# Patient Record
Sex: MALE | NOT HISPANIC OR LATINO | Employment: OTHER | URBAN - METROPOLITAN AREA
[De-identification: names, ages, dates, MRNs, and addresses within clinical notes are randomized per-mention and may not be internally consistent; named-entity substitution may affect disease eponyms.]

---

## 2017-05-27 ENCOUNTER — APPOINTMENT (OUTPATIENT)
Dept: CT IMAGING | Facility: HOSPITAL | Age: 77
End: 2017-05-27

## 2017-05-27 ENCOUNTER — HOSPITAL ENCOUNTER (EMERGENCY)
Facility: HOSPITAL | Age: 77
Discharge: HOME | End: 2017-05-27
Attending: EMERGENCY MEDICINE | Admitting: EMERGENCY MEDICINE

## 2017-05-27 VITALS
TEMPERATURE: 97.5 F | HEIGHT: 60 IN | SYSTOLIC BLOOD PRESSURE: 117 MMHG | OXYGEN SATURATION: 95 % | HEART RATE: 88 BPM | RESPIRATION RATE: 16 BRPM | DIASTOLIC BLOOD PRESSURE: 80 MMHG | WEIGHT: 180 LBS | BODY MASS INDEX: 35.34 KG/M2

## 2017-05-27 DIAGNOSIS — M54.2 NECK PAIN: Primary | ICD-10-CM

## 2017-05-27 LAB
ALBUMIN SERPL-MCNC: 3.7 G/DL (ref 3.5–5.2)
ALBUMIN/GLOB SERPL: 1.2 G/DL
ALP SERPL-CCNC: 57 U/L (ref 39–117)
ALT SERPL W P-5'-P-CCNC: 17 U/L (ref 1–41)
ANION GAP SERPL CALCULATED.3IONS-SCNC: 11.4 MMOL/L
AST SERPL-CCNC: 15 U/L (ref 1–40)
BASOPHILS # BLD AUTO: 0.03 10*3/MM3 (ref 0–0.2)
BASOPHILS NFR BLD AUTO: 0.4 % (ref 0–1.5)
BILIRUB SERPL-MCNC: 0.3 MG/DL (ref 0.1–1.2)
BUN BLD-MCNC: 12 MG/DL (ref 8–23)
BUN/CREAT SERPL: 9.4 (ref 7–25)
CALCIUM SPEC-SCNC: 9.3 MG/DL (ref 8.6–10.5)
CHLORIDE SERPL-SCNC: 99 MMOL/L (ref 98–107)
CO2 SERPL-SCNC: 26.6 MMOL/L (ref 22–29)
CREAT BLD-MCNC: 1.28 MG/DL (ref 0.76–1.27)
DEPRECATED RDW RBC AUTO: 44.8 FL (ref 37–54)
EOSINOPHIL # BLD AUTO: 0.16 10*3/MM3 (ref 0–0.7)
EOSINOPHIL NFR BLD AUTO: 2.2 % (ref 0.3–6.2)
ERYTHROCYTE [DISTWIDTH] IN BLOOD BY AUTOMATED COUNT: 14.5 % (ref 11.5–14.5)
GFR SERPL CREATININE-BSD FRML MDRD: 55 ML/MIN/1.73
GLOBULIN UR ELPH-MCNC: 3.1 GM/DL
GLUCOSE BLD-MCNC: 164 MG/DL (ref 65–99)
HCT VFR BLD AUTO: 41.4 % (ref 40.4–52.2)
HGB BLD-MCNC: 14.2 G/DL (ref 13.7–17.6)
IMM GRANULOCYTES # BLD: 0.02 10*3/MM3 (ref 0–0.03)
IMM GRANULOCYTES NFR BLD: 0.3 % (ref 0–0.5)
LYMPHOCYTES # BLD AUTO: 1.28 10*3/MM3 (ref 0.9–4.8)
LYMPHOCYTES NFR BLD AUTO: 18 % (ref 19.6–45.3)
MCH RBC QN AUTO: 28.9 PG (ref 27–32.7)
MCHC RBC AUTO-ENTMCNC: 34.3 G/DL (ref 32.6–36.4)
MCV RBC AUTO: 84.1 FL (ref 79.8–96.2)
MONOCYTES # BLD AUTO: 0.51 10*3/MM3 (ref 0.2–1.2)
MONOCYTES NFR BLD AUTO: 7.2 % (ref 5–12)
NEUTROPHILS # BLD AUTO: 5.12 10*3/MM3 (ref 1.9–8.1)
NEUTROPHILS NFR BLD AUTO: 71.9 % (ref 42.7–76)
PLATELET # BLD AUTO: 189 10*3/MM3 (ref 140–500)
PMV BLD AUTO: 9.5 FL (ref 6–12)
POTASSIUM BLD-SCNC: 4.3 MMOL/L (ref 3.5–5.2)
PROT SERPL-MCNC: 6.8 G/DL (ref 6–8.5)
RBC # BLD AUTO: 4.92 10*6/MM3 (ref 4.6–6)
S PYO AG THROAT QL: NEGATIVE
SODIUM BLD-SCNC: 137 MMOL/L (ref 136–145)
WBC NRBC COR # BLD: 7.12 10*3/MM3 (ref 4.5–10.7)

## 2017-05-27 PROCEDURE — 85025 COMPLETE CBC W/AUTO DIFF WBC: CPT | Performed by: NURSE PRACTITIONER

## 2017-05-27 PROCEDURE — 80053 COMPREHEN METABOLIC PANEL: CPT | Performed by: NURSE PRACTITIONER

## 2017-05-27 PROCEDURE — 99283 EMERGENCY DEPT VISIT LOW MDM: CPT

## 2017-05-27 PROCEDURE — 0 IOPAMIDOL 61 % SOLUTION: Performed by: NURSE PRACTITIONER

## 2017-05-27 PROCEDURE — 87081 CULTURE SCREEN ONLY: CPT | Performed by: NURSE PRACTITIONER

## 2017-05-27 PROCEDURE — 70491 CT SOFT TISSUE NECK W/DYE: CPT

## 2017-05-27 PROCEDURE — 87880 STREP A ASSAY W/OPTIC: CPT | Performed by: NURSE PRACTITIONER

## 2017-05-27 PROCEDURE — 96361 HYDRATE IV INFUSION ADD-ON: CPT

## 2017-05-27 PROCEDURE — 96360 HYDRATION IV INFUSION INIT: CPT

## 2017-05-27 RX ORDER — SIMVASTATIN 40 MG
40 TABLET ORAL NIGHTLY
COMMUNITY

## 2017-05-27 RX ORDER — NAPROXEN 500 MG/1
500 TABLET ORAL 2 TIMES DAILY PRN
Qty: 10 TABLET | Refills: 0 | Status: SHIPPED | OUTPATIENT
Start: 2017-05-27 | End: 2018-09-17 | Stop reason: ALTCHOICE

## 2017-05-27 RX ORDER — AMLODIPINE BESYLATE AND BENAZEPRIL HYDROCHLORIDE 10; 40 MG/1; MG/1
1 CAPSULE ORAL DAILY
COMMUNITY

## 2017-05-27 RX ORDER — ACETAMINOPHEN 500 MG
1000 TABLET ORAL ONCE
Status: COMPLETED | OUTPATIENT
Start: 2017-05-27 | End: 2017-05-27

## 2017-05-27 RX ORDER — GLIMEPIRIDE 4 MG/1
4 TABLET ORAL
COMMUNITY

## 2017-05-27 RX ADMIN — SODIUM CHLORIDE 1000 ML: 9 INJECTION, SOLUTION INTRAVENOUS at 10:35

## 2017-05-27 RX ADMIN — IOPAMIDOL 75 ML: 612 INJECTION, SOLUTION INTRAVENOUS at 11:14

## 2017-05-27 RX ADMIN — ACETAMINOPHEN 1000 MG: 500 TABLET ORAL at 11:22

## 2017-05-29 LAB — BACTERIA SPEC AEROBE CULT: NORMAL

## 2018-09-25 ENCOUNTER — OFFICE VISIT (OUTPATIENT)
Dept: NEUROSURGERY | Facility: CLINIC | Age: 78
End: 2018-09-25

## 2018-09-25 VITALS
HEIGHT: 71 IN | RESPIRATION RATE: 16 BRPM | DIASTOLIC BLOOD PRESSURE: 64 MMHG | WEIGHT: 188 LBS | HEART RATE: 76 BPM | BODY MASS INDEX: 26.32 KG/M2 | SYSTOLIC BLOOD PRESSURE: 110 MMHG

## 2018-09-25 DIAGNOSIS — M43.10 ACQUIRED SPONDYLOLISTHESIS: ICD-10-CM

## 2018-09-25 DIAGNOSIS — M48.062 LUMBAR STENOSIS WITH NEUROGENIC CLAUDICATION: ICD-10-CM

## 2018-09-25 DIAGNOSIS — M51.26 LUMBAR DISC HERNIATION: ICD-10-CM

## 2018-09-25 DIAGNOSIS — I10 HYPERTENSION, UNSPECIFIED TYPE: ICD-10-CM

## 2018-09-25 DIAGNOSIS — M62.559 ATROPHY OF QUADRICEPS FEMORIS MUSCLE: Primary | ICD-10-CM

## 2018-09-25 DIAGNOSIS — E11.8 TYPE 2 DIABETES MELLITUS WITH COMPLICATION, WITHOUT LONG-TERM CURRENT USE OF INSULIN (HCC): ICD-10-CM

## 2018-09-25 PROBLEM — E78.5 HYPERLIPIDEMIA: Status: ACTIVE | Noted: 2018-09-25

## 2018-09-25 PROBLEM — E11.9 DIABETES MELLITUS (HCC): Status: ACTIVE | Noted: 2018-09-25

## 2018-09-25 PROCEDURE — 99204 OFFICE O/P NEW MOD 45 MIN: CPT | Performed by: NEUROLOGICAL SURGERY

## 2018-09-25 NOTE — PROGRESS NOTES
Subjective   Patient ID: Claudio Haney is a 78 y.o. male is being seen for consultation today at the request of A Tiffanie Marina MD for right leg and back pain. He is unaccompanied.    History of Present Illness  Patient presents for evaluation and treatment recommendations of back and right leg pain. It began about 1 year ago. The low back pain is intermittent with activity. It is an aggravating, annoying pain, worst 8/10. It resolves with rest. He had associated right leg pain from the onset. It bothers him more than the back pain. It is an aching pain, most severe in the anterior thigh and occasionally in the shin. He cannot walk more than 20 feet without pain; no left leg pain. He has resolution with sitting. He has right leg weakness with walking and climbing stairs. No falls. He has constipation. He has PVD, no other incontinence. He has had PT, chiropractic care, and 3 LESI with brief relief. No meds for pain. No prior back surgery.     The following portions of the patient's history were reviewed and updated as appropriate: allergies, current medications, past family history, past medical history, past social history, past surgical history and problem list.    Review of Systems   Constitutional: Negative for fever.   HENT: Negative for trouble swallowing.    Eyes: Negative for visual disturbance.   Respiratory: Negative for cough and wheezing.    Cardiovascular: Negative for chest pain and palpitations.   Gastrointestinal: Negative for abdominal pain.   Genitourinary: Positive for enuresis. Negative for difficulty urinating.   Musculoskeletal: Positive for back pain (into right leg). Negative for gait problem.   Skin: Negative for rash.   Neurological: Positive for weakness (RLE). Negative for numbness.   Psychiatric/Behavioral: Negative for sleep disturbance.       Objective   Physical Exam   Constitutional: He is oriented to person, place, and time. He appears well-developed and well-nourished.   Body mass  index is 26.22 kg/m².     Pulmonary/Chest: Effort normal.   Musculoskeletal:        Lumbar back: He exhibits decreased range of motion and pain (with extension). He exhibits no tenderness.   Neurological: He is alert and oriented to person, place, and time.   Reflex Scores:       Patellar reflexes are 1+ on the right side and 1+ on the left side.       Achilles reflexes are 0 on the right side and 0 on the left side.  Skin: Skin is warm and dry.   Psychiatric: He has a normal mood and affect. His behavior is normal. Judgment normal.   Vitals reviewed.    Neurologic Exam     Mental Status   Oriented to person, place, and time.   Level of consciousness: alert  Knowledge: good.   Normal comprehension.     Motor Exam   Muscle bulk: decreased (right quad and left calf)    Strength   Strength 5/5 except as noted.   Right iliopsoas: 4/5Right quad 4+/5       Sensory Exam   Right leg light touch: normal  Left leg light touch: normal  Right leg vibration: decreased from ankle  Left leg vibration: decreased from ankle  Non dermatonal temperature changes taco LEs     Gait, Coordination, and Reflexes     Gait  Gait: (antalgic)    Reflexes   Right patellar: 1+  Left patellar: 1+  Right achilles: 0  Left achilles: 0  Able to heel walk bilaterally; some difficulty toe walking on right; unable to arise from a squatted position on right leg singularly       Assessment/Plan   Independent Review of Radiographic Studies:    I personally reviewed an MRI scan of the lumbar spine done in April 2018: This demonstrates severe lumbar spinal stenosis at L3 4 with a grade 1 spondylolisthesis, a large disc herniation to the right at L4 5, and a right-sided L5-S1 disc herniation.  At L4 5 there is obliteration of the right neural foramen filled with disc material.  Medical Decision Making:    I confirmed and obtained the above history as recorded by the nurse practitioner acting as a scribe. I performed the above examination and it is documented  by the nurse practitioner acting as a scribe.    As described above this unfortunate patient has been suffering with back problems at least for 2 years.  He has gradually developed more and more atrophy of the right quadriceps muscle.  He has constant discomfort that is severely worsened by walking.  He is unable to walk more than a few feet without severe pain.  As noted he has atrophy and weakness in the lower extremity.  As noted his MRI from 6 months ago shows very severe lumbar disease with severe stenosis and a grade 1 spondylolisthesis.    I'm recommending that he undergo to contemporary imaging to make sure there were not dealing with something else in the lumbar spine.  Ordered a new MRI.  He also has a grade 1 slip of the L3 bone on the L4 bone anteriorly so I am ordering flexion-extension x-rays of the lumbar spine as well in order to see whether there is any abnormal motion of these left.    I suspect that this unfortunate gentleman is going to require some kind of surgical procedure and the new imaging will help me determine the least invasive procedure that we can accomplish in order to decompress the spinal nerve roots.  I do believe that decompression of the spinal nerve roots will give him the best chance of recovery.    Given that this is been ongoing and that there is severe atrophy of the right quadriceps I'm hopeful that he will have some level of recovery following decompression of the nerve roots.  I explained to the patient that this may not be forthcoming because this problem has been ongoing for a while.      Claudio was seen today for leg pain and back pain.    Diagnoses and all orders for this visit:    Atrophy of right quadriceps femoris muscle    Lumbar stenosis with neurogenic claudication  -     MRI Lumbar Spine Without Contrast; Future  -     XR Spine Cervical Complete With Flex Ext; Future    Left  L4/5 and L5/Y7quqmk disc herniation    Type 2 diabetes mellitus with complication,  without long-term current use of insulin (CMS/[facility])    Hypertension, unspecified type    Acquired spondylolisthesis L3/4 Grade I      Return for Follow up after testing.

## 2018-10-30 ENCOUNTER — OFFICE VISIT (OUTPATIENT)
Dept: NEUROSURGERY | Facility: CLINIC | Age: 78
End: 2018-10-30

## 2018-10-30 ENCOUNTER — HOSPITAL ENCOUNTER (OUTPATIENT)
Dept: GENERAL RADIOLOGY | Facility: HOSPITAL | Age: 78
Discharge: HOME | End: 2018-10-30
Attending: NEUROLOGICAL SURGERY

## 2018-10-30 ENCOUNTER — HOSPITAL ENCOUNTER (OUTPATIENT)
Dept: GENERAL RADIOLOGY | Facility: HOSPITAL | Age: 78
Discharge: HOME | End: 2018-10-30
Attending: NEUROLOGICAL SURGERY | Admitting: NEUROLOGICAL SURGERY

## 2018-10-30 VITALS
WEIGHT: 185 LBS | DIASTOLIC BLOOD PRESSURE: 60 MMHG | HEART RATE: 76 BPM | SYSTOLIC BLOOD PRESSURE: 100 MMHG | BODY MASS INDEX: 25.8 KG/M2 | RESPIRATION RATE: 16 BRPM

## 2018-10-30 DIAGNOSIS — M43.10 ACQUIRED SPONDYLOLISTHESIS: ICD-10-CM

## 2018-10-30 DIAGNOSIS — E78.5 HYPERLIPIDEMIA, UNSPECIFIED HYPERLIPIDEMIA TYPE: ICD-10-CM

## 2018-10-30 DIAGNOSIS — M48.062 LUMBAR STENOSIS WITH NEUROGENIC CLAUDICATION: Primary | ICD-10-CM

## 2018-10-30 DIAGNOSIS — M48.062 LUMBAR STENOSIS WITH NEUROGENIC CLAUDICATION: ICD-10-CM

## 2018-10-30 DIAGNOSIS — M62.559 ATROPHY OF QUADRICEPS FEMORIS MUSCLE: ICD-10-CM

## 2018-10-30 DIAGNOSIS — M51.26 LUMBAR DISC HERNIATION: ICD-10-CM

## 2018-10-30 DIAGNOSIS — I10 HYPERTENSION, UNSPECIFIED TYPE: ICD-10-CM

## 2018-10-30 DIAGNOSIS — E11.65 TYPE 2 DIABETES MELLITUS WITH HYPERGLYCEMIA, WITHOUT LONG-TERM CURRENT USE OF INSULIN (HCC): ICD-10-CM

## 2018-10-30 PROCEDURE — 99214 OFFICE O/P EST MOD 30 MIN: CPT | Performed by: NEUROLOGICAL SURGERY

## 2018-10-30 PROCEDURE — 72114 X-RAY EXAM L-S SPINE BENDING: CPT

## 2018-10-30 RX ORDER — SODIUM CHLORIDE, SODIUM LACTATE, POTASSIUM CHLORIDE, CALCIUM CHLORIDE 600; 310; 30; 20 MG/100ML; MG/100ML; MG/100ML; MG/100ML
100 INJECTION, SOLUTION INTRAVENOUS CONTINUOUS
Status: CANCELLED | OUTPATIENT
Start: 2018-12-17

## 2018-10-30 RX ORDER — CEFAZOLIN SODIUM 2 G/100ML
2 INJECTION, SOLUTION INTRAVENOUS ONCE
Status: CANCELLED | OUTPATIENT
Start: 2018-12-17 | End: 2018-10-30

## 2018-10-30 NOTE — PROGRESS NOTES
Subjective   Patient ID: Claudio Haney is a 78 y.o. male is here today for follow-up of right leg weakness with new MRI and xrays. He is unaccompanied.    History of Present Illness    He returns to the office today for ongoing follow-up with history of right leg weakness.  He has had new l MRI and umbar x-rays with flexion and extension.    The right leg pain started approximately 1 year ago and is worse then back pain.  It is an aching discomfort more severe in the anterior thigh and occasionally into the shin.  He cannot walk more than 20 feet without pain.  He has tried conservative treatment with PT, chiropractic care and epidurals, none of which have really helped.    He continues with the same right leg symptoms and denies any changes.  He continues to feel very weak in the right leg.  He is ready to proceed forward with surgery.  He has significant atrophy of the right quad muscle which is making walking difficult.    He has no bowel or bladder incontinence.  He presents unaccompanied.        /60 (BP Location: Right arm, Patient Position: Sitting, Cuff Size: Adult)   Pulse 76   Resp 16   Wt 83.9 kg (185 lb)   BMI 25.80 kg/m²        The following portions of the patient's history were reviewed and updated as appropriate: allergies, current medications, past family history, past medical history, past social history, past surgical history and problem list.    Review of Systems   Musculoskeletal: Positive for gait problem. Negative for back pain.   Neurological: Positive for weakness (right leg) and numbness (very minor, intermittent tingling right leg).       Objective   Physical Exam   Constitutional: He is oriented to person, place, and time. Vital signs are normal. He appears well-developed and well-nourished. He is cooperative.  Non-toxic appearance. He does not have a sickly appearance. He does not appear ill.   HENT:   Head: Normocephalic and atraumatic.   Eyes: EOM are normal.   Neck: Neck  supple.   Pulmonary/Chest: Effort normal.   Musculoskeletal: Normal range of motion.   Right lower extremity weakness with right quad atrophy   Neurological: He is alert and oriented to person, place, and time. GCS eye subscore is 4. GCS verbal subscore is 5. GCS motor subscore is 6.   If stable, upright and antalgic, forward flexed the waist   Skin: Skin is warm and dry.   Vitals reviewed.    Neurologic Exam     Mental Status   Oriented to person, place, and time.     Cranial Nerves     CN III, IV, VI   Extraocular motions are normal.       Assessment/Plan   Independent Review of Radiographic Studies:    I personally reviewed the MRI scan of the lumbar spine done recently: This demonstrates severe lumbar spinal stenosis at L3 4, and disc herniation to the right at L4 5 and at L5-S1 with compromise of the lateral recess and neural foramen.  Flexion-extension x-rays demonstrate mobile spondylolisthesis at L3 4 of 5 mm or more in flexion and with extension this is completely reduced.    Medical Decision Making:    I confirmed and obtained the above history as recorded by the nurse practitioner acting as a scribe. I performed the above examination and it is documented by the nurse practitioner acting as a scribe.    Patient returns the office with continuing complaints as described above.  He does have significant atrophy of the leg as well as severe pain with ambulation.  This is completely inexplicable based on the MRI and the flexion extension x-rays of the lumbar spine.    I do not think that there is nonsurgical management that is going to make any difference at all.    I think that surgery gives him the best chance and hope of recovery.    The patient has substantial atrophy of the leg and therefore I'm concerned that it may not return to a fully normal state that I'm hopeful for improvement.    I think that the least invasive way to deal with this would be a minimally invasive lumbar decompression at L4 5 and  L5-S1 with lumbar discectomy.  I would then follow with the L3 4 lumbar decompression and interbody fusion with posterior and interbody instrumentation.    This minimally invasive approach addresses all 3 levels of significant spinal pathology to decompress the nerve roots coming into the right leg.    The risks, benefits, and alternatives of decompression and fusion were explained in detail to the patient. The alternative is not to perform an operative procedure. The benefit should be, though is not guaranteed, primarily a potential reduction in the neurosensory and/or motor dysfunction; secondarily, a possible reduction in back pain. The risks include, but are not limited to, the possibility of death, infection, stroke, bleeding, blindness, paralysis, blindness, lack of improvement in the patient's pain syndrome, worsening of the pain syndrome, meningitis, osteomyelitis, recurrent disc herniation, need for further operative intervention, recurrence of the pain syndrome, sexual dysfunction, incontinence, inability to urinate without catheterization, inability to have a normal bowel movement, epidural scarring resulting in chronic back pain, leakage of cerebral spinal fluid at the time of surgery or after recovery from surgery requiring further operative intervention, lack of bony fusion requiring further surgery, instrumentation breakdown or pull out, adjacent level spinal degeneration, spinal instability. I also explained the realistic expectations as they pertain to the procedure. The patient voiced understanding of these risks, benefits, alternatives, and realistic expectations and requests that we proceed with the operative intervention.    After a complete physical exam, the patient has been informed of the consequences, benefits, appropriate us, and office policies regarding the medication being prescribed. A MAICOL check will be made on-line, and will be repeated if prescription is renewed after a 90 day  period. The patient agrees to adhering to the medication regimen as prescribed.    The patient has been advised that we will manage post-operative pain for 1 month. If further narcotic medication is needed beyond that period, a referral back to the primary care physician or to a pain management specialist will be made. If the patient cancels or fails to show for scheduled follow-up visits or the pain management referral,  further narcotic prescriptions from this practice may cease.    Plan:Right L4 5 and L5-S1 lumbar discectomy, Right L3 4 lumbar decompression with interbody and posterior lateral fusion and interbody and posterior instrumentation.  He will need medical clearance prior to surgery.    Claudio was seen today for extremity weakness.    Diagnoses and all orders for this visit:    Lumbar stenosis with neurogenic claudication  -     Case Request; Standing  -     Basic metabolic panel; Future  -     Sedimentation rate; Future  -     ECG 12 Lead; Future  -     XR Chest 2 View; Future  -     lactated ringers infusion; Infuse 100 mL/hr into a venous catheter Continuous.  -     ceFAZolin (ANCEF) 2 g in sodium chloride 0.9 % 100 mL IVPB; Infuse 2 g into a venous catheter 1 (One) Time.  -     Case Request    Atrophy of right quadriceps femoris muscle    Acquired spondylolisthesis L3/4 Grade I    Type 2 diabetes mellitus with hyperglycemia, without long-term current use of insulin (CMS/[facility])    Hyperlipidemia, unspecified hyperlipidemia type    Hypertension, unspecified type    Left  L4/5 and L5/E4kwwis disc herniation    Other orders  -     Follow anesthesia standing orders.  -     Obtain informed consent  -     Clorhexidine skin prep; Future  -     Follow anesthesia standing orders.; Standing  -     Verify NPO Status; Standing  -     SCD (sequential compression device)- to be placed on patient in Pre-op; Standing  -     POC Glucose Once; Standing  -     Inpatient Admission; Standing  -     Type & Screen;  Standing      Return for Follow up with nurse practitioner, Recheck 2 weeks after surgery.

## 2018-11-04 ENCOUNTER — RESULTS ENCOUNTER (OUTPATIENT)
Dept: NEUROSURGERY | Facility: CLINIC | Age: 78
End: 2018-11-04

## 2018-11-04 DIAGNOSIS — M48.062 LUMBAR STENOSIS WITH NEUROGENIC CLAUDICATION: ICD-10-CM

## 2018-11-13 ENCOUNTER — TELEPHONE (OUTPATIENT)
Dept: NEUROSURGERY | Facility: CLINIC | Age: 78
End: 2018-11-13

## 2018-11-13 DIAGNOSIS — M48.061 SPINAL STENOSIS, LUMBAR REGION, WITHOUT NEUROGENIC CLAUDICATION: Primary | ICD-10-CM

## 2018-11-13 DIAGNOSIS — Z98.890 POST-OPERATIVE STATE: ICD-10-CM

## 2018-11-13 NOTE — TELEPHONE ENCOUNTER
Please enter Post op lumbar xray AP/LAT order for diagnosis of lumbar stenosis. Post op appt is on December 31 at 1:00 with Dodie. Thanks!

## 2018-11-30 ENCOUNTER — OFFICE VISIT (OUTPATIENT)
Dept: NEUROSURGERY | Facility: CLINIC | Age: 78
End: 2018-11-30

## 2018-11-30 ENCOUNTER — HOSPITAL ENCOUNTER (OUTPATIENT)
Dept: GENERAL RADIOLOGY | Facility: HOSPITAL | Age: 78
Discharge: HOME | End: 2018-11-30
Admitting: NEUROLOGICAL SURGERY

## 2018-11-30 ENCOUNTER — APPOINTMENT (OUTPATIENT)
Dept: PREADMISSION TESTING | Facility: HOSPITAL | Age: 78
End: 2018-11-30

## 2018-11-30 VITALS
WEIGHT: 189 LBS | SYSTOLIC BLOOD PRESSURE: 110 MMHG | DIASTOLIC BLOOD PRESSURE: 70 MMHG | HEART RATE: 68 BPM | RESPIRATION RATE: 16 BRPM | HEIGHT: 71 IN | BODY MASS INDEX: 26.46 KG/M2

## 2018-11-30 VITALS
DIASTOLIC BLOOD PRESSURE: 77 MMHG | BODY MASS INDEX: 26.6 KG/M2 | SYSTOLIC BLOOD PRESSURE: 116 MMHG | RESPIRATION RATE: 20 BRPM | WEIGHT: 190 LBS | HEART RATE: 64 BPM | TEMPERATURE: 97.7 F | OXYGEN SATURATION: 99 % | HEIGHT: 71 IN

## 2018-11-30 DIAGNOSIS — M62.559 ATROPHY OF QUADRICEPS FEMORIS MUSCLE: ICD-10-CM

## 2018-11-30 DIAGNOSIS — M48.062 LUMBAR STENOSIS WITH NEUROGENIC CLAUDICATION: ICD-10-CM

## 2018-11-30 DIAGNOSIS — M43.10 ACQUIRED SPONDYLOLISTHESIS: ICD-10-CM

## 2018-11-30 DIAGNOSIS — M48.062 LUMBAR STENOSIS WITH NEUROGENIC CLAUDICATION: Primary | ICD-10-CM

## 2018-11-30 DIAGNOSIS — M51.26 LUMBAR DISC HERNIATION: ICD-10-CM

## 2018-11-30 LAB
ANION GAP SERPL CALCULATED.3IONS-SCNC: 11 MMOL/L
BUN BLD-MCNC: 19 MG/DL (ref 8–23)
BUN/CREAT SERPL: 15.8 (ref 7–25)
CALCIUM SPEC-SCNC: 9 MG/DL (ref 8.6–10.5)
CHLORIDE SERPL-SCNC: 99 MMOL/L (ref 98–107)
CO2 SERPL-SCNC: 26 MMOL/L (ref 22–29)
CREAT BLD-MCNC: 1.2 MG/DL (ref 0.76–1.27)
DEPRECATED RDW RBC AUTO: 44.5 FL (ref 37–54)
ERYTHROCYTE [DISTWIDTH] IN BLOOD BY AUTOMATED COUNT: 13.6 % (ref 11.5–14.5)
ERYTHROCYTE [SEDIMENTATION RATE] IN BLOOD: 7 MM/HR (ref 0–20)
GFR SERPL CREATININE-BSD FRML MDRD: 59 ML/MIN/1.73
GLUCOSE BLD-MCNC: 180 MG/DL (ref 65–99)
HCT VFR BLD AUTO: 44.2 % (ref 40.4–52.2)
HGB BLD-MCNC: 13.8 G/DL (ref 13.7–17.6)
MCH RBC QN AUTO: 28.5 PG (ref 27–32.7)
MCHC RBC AUTO-ENTMCNC: 31.2 G/DL (ref 32.6–36.4)
MCV RBC AUTO: 91.1 FL (ref 79.8–96.2)
PLATELET # BLD AUTO: 218 10*3/MM3 (ref 140–500)
PMV BLD AUTO: 9.7 FL (ref 6–12)
POTASSIUM BLD-SCNC: 4.5 MMOL/L (ref 3.5–5.2)
RBC # BLD AUTO: 4.85 10*6/MM3 (ref 4.6–6)
SODIUM BLD-SCNC: 136 MMOL/L (ref 136–145)
WBC NRBC COR # BLD: 7.66 10*3/MM3 (ref 4.5–10.7)

## 2018-11-30 PROCEDURE — 93010 ELECTROCARDIOGRAM REPORT: CPT | Performed by: INTERNAL MEDICINE

## 2018-11-30 PROCEDURE — 36415 COLL VENOUS BLD VENIPUNCTURE: CPT | Performed by: NEUROLOGICAL SURGERY

## 2018-11-30 PROCEDURE — 71046 X-RAY EXAM CHEST 2 VIEWS: CPT

## 2018-11-30 PROCEDURE — 85027 COMPLETE CBC AUTOMATED: CPT | Performed by: NEUROLOGICAL SURGERY

## 2018-11-30 PROCEDURE — 93005 ELECTROCARDIOGRAM TRACING: CPT

## 2018-11-30 PROCEDURE — S0260 H&P FOR SURGERY: HCPCS | Performed by: NEUROLOGICAL SURGERY

## 2018-11-30 PROCEDURE — 80048 BASIC METABOLIC PNL TOTAL CA: CPT | Performed by: NEUROLOGICAL SURGERY

## 2018-11-30 PROCEDURE — 85652 RBC SED RATE AUTOMATED: CPT | Performed by: NEUROLOGICAL SURGERY

## 2018-11-30 NOTE — PROGRESS NOTES
"Subjective   Patient ID: Claudio Haney is a 78 y.o. male is here today for follow-up of lumbar stenosis with atrophy prior to scheduled surgery on 12/13/18 .    History of Present Illness     He returns to the office today for ongoing follow-up of right leg weakness, pain and atrophy.  He will also undergo preoperative evaluation and updated history and physical before scheduled surgery on December 13.  He is scheduled to undergo right-sided L4 5 and L5-S1 lumbar discectomy as well as right L3 4 lumbar decompression and fusion.    He denies any new problems and states that he is feeling well.  He is hopeful surgery will help with this ongoing right leg issues.  He states that he has decreased his activity level which has helped to minimally decreased some right leg pain.  He denies any recent fever or chills.  He is ready to proceed forward with surgery as scheduled.    He presents unaccompanied      Blood pressure 110/70, pulse 68, resp. rate 16, height 180.3 cm (71\"), weight 85.7 kg (189 lb).        The following portions of the patient's history were reviewed and updated as appropriate: allergies, current medications, past family history, past medical history, past social history, past surgical history and problem list.    Review of Systems   Constitutional: Negative for fever.   HENT: Negative for trouble swallowing.    Respiratory: Negative for cough and wheezing.    Cardiovascular: Negative for chest pain and leg swelling.   Musculoskeletal: Positive for back pain (better with rest) and gait problem.        Muscle atrophy noted in right leg   Neurological: Positive for weakness. Negative for numbness.       Objective   Physical Exam   Constitutional: He is oriented to person, place, and time. Vital signs are normal. He appears well-developed and well-nourished. He is cooperative.  Non-toxic appearance. He does not have a sickly appearance. He does not appear ill.   Very pleasant, well-appearing older gentleman "   HENT:   Head: Normocephalic and atraumatic.   Eyes: EOM are normal. Pupils are equal, round, and reactive to light.   Neck: Normal range of motion. Neck supple. Carotid bruit is not present. No tracheal deviation present.   Cardiovascular: Normal rate, regular rhythm, normal heart sounds and intact distal pulses.   Pulmonary/Chest: Effort normal and breath sounds normal. No respiratory distress.   Abdominal: Soft.   Musculoskeletal: Normal range of motion.   Neurological: He is alert and oriented to person, place, and time. No cranial nerve deficit. Coordination and gait normal. GCS eye subscore is 4. GCS verbal subscore is 5. GCS motor subscore is 6.   Gait is stable and upright, antalgic on the right   Skin: Skin is warm and dry.   Psychiatric: He has a normal mood and affect. His behavior is normal. Thought content normal.   Vitals reviewed.    Neurologic Exam     Mental Status   Oriented to person, place, and time.     Cranial Nerves     CN III, IV, VI   Pupils are equal, round, and reactive to light.  Extraocular motions are normal.       Assessment/Plan   Independent Review of Radiographic Studies:    Preoperative lab work, EKG and chest x-ray reviewed revealing stable findings      Medical Decision Making:    He returns the office today for preoperative evaluation before undergoing scheduled lumbar decompression and fusion with Dr. Flores on December 13.  Exam as noted above, no red flags.  He denies any new problems or issues since his last office visit but does continue with right quadriceps weakness, atrophy in right leg pain.  He is ready to proceed forward with surgery as scheduled and upon full clinical evaluation is stable from our standpoint to do so.  Risks and benefits were explained at length by Dr. Flores at his last office visit.  All questions were answered at length.  He is to follow-up at his previously scheduled postoperative visit.    He has been placed in an LSO brace with lateral support  for preoperative stabilization and postoperative healing.    Plan: Return to office following surgery as scheduled        Claudio was seen today for pre-op exam.    Diagnoses and all orders for this visit:    Lumbar stenosis with neurogenic claudication    Left  L4/5 and L5/Y1ihmqi disc herniation    Atrophy of right quadriceps femoris muscle    Acquired spondylolisthesis L3/4 Grade I      No Follow-up on file.

## 2018-12-17 ENCOUNTER — ANESTHESIA (OUTPATIENT)
Dept: PERIOP | Facility: HOSPITAL | Age: 78
End: 2018-12-17

## 2018-12-17 ENCOUNTER — APPOINTMENT (OUTPATIENT)
Dept: GENERAL RADIOLOGY | Facility: HOSPITAL | Age: 78
End: 2018-12-17

## 2018-12-17 ENCOUNTER — HOSPITAL ENCOUNTER (INPATIENT)
Facility: HOSPITAL | Age: 78
LOS: 3 days | Discharge: HOME HEALTH | End: 2018-12-20
Attending: NEUROLOGICAL SURGERY | Admitting: NEUROLOGICAL SURGERY

## 2018-12-17 ENCOUNTER — ANESTHESIA EVENT (OUTPATIENT)
Dept: PERIOP | Facility: HOSPITAL | Age: 78
End: 2018-12-17

## 2018-12-17 DIAGNOSIS — M51.26 LUMBAR DISC HERNIATION: Primary | ICD-10-CM

## 2018-12-17 DIAGNOSIS — M62.559 ATROPHY OF QUADRICEPS FEMORIS MUSCLE: ICD-10-CM

## 2018-12-17 DIAGNOSIS — M48.062 LUMBAR STENOSIS WITH NEUROGENIC CLAUDICATION: ICD-10-CM

## 2018-12-17 DIAGNOSIS — M43.10 ACQUIRED SPONDYLOLISTHESIS: ICD-10-CM

## 2018-12-17 LAB
ABO GROUP BLD: NORMAL
BLD GP AB SCN SERPL QL: NEGATIVE
DEPRECATED RDW RBC AUTO: 47.3 FL (ref 37–54)
ERYTHROCYTE [DISTWIDTH] IN BLOOD BY AUTOMATED COUNT: 13.9 % (ref 11.5–14.5)
GLUCOSE BLDC GLUCOMTR-MCNC: 153 MG/DL (ref 70–130)
GLUCOSE BLDC GLUCOMTR-MCNC: 262 MG/DL (ref 70–130)
HCT VFR BLD AUTO: 40.1 % (ref 40.4–52.2)
HGB BLD-MCNC: 12.2 G/DL (ref 13.7–17.6)
MCH RBC QN AUTO: 28 PG (ref 27–32.7)
MCHC RBC AUTO-ENTMCNC: 30.4 G/DL (ref 32.6–36.4)
MCV RBC AUTO: 92.2 FL (ref 79.8–96.2)
PLATELET # BLD AUTO: 264 10*3/MM3 (ref 140–500)
PMV BLD AUTO: 10.1 FL (ref 6–12)
RBC # BLD AUTO: 4.35 10*6/MM3 (ref 4.6–6)
RH BLD: POSITIVE
T&S EXPIRATION DATE: NORMAL
WBC NRBC COR # BLD: 15.66 10*3/MM3 (ref 4.5–10.7)

## 2018-12-17 PROCEDURE — 93010 ELECTROCARDIOGRAM REPORT: CPT | Performed by: INTERNAL MEDICINE

## 2018-12-17 PROCEDURE — 76000 FLUOROSCOPY <1 HR PHYS/QHP: CPT

## 2018-12-17 PROCEDURE — 25010000002 PHENYLEPHRINE 10 MG/ML SOLUTION 5 ML VIAL: Performed by: ANESTHESIOLOGY

## 2018-12-17 PROCEDURE — 25010000002 PHENYLEPHRINE PER 1 ML: Performed by: NURSE ANESTHETIST, CERTIFIED REGISTERED

## 2018-12-17 PROCEDURE — 01NB0ZZ RELEASE LUMBAR NERVE, OPEN APPROACH: ICD-10-PCS | Performed by: NEUROLOGICAL SURGERY

## 2018-12-17 PROCEDURE — 63047 LAM FACETEC & FORAMOT LUMBAR: CPT | Performed by: NEUROLOGICAL SURGERY

## 2018-12-17 PROCEDURE — 25010000002 VANCOMYCIN 1 G RECONSTITUTED SOLUTION 1 EACH VIAL: Performed by: NEUROLOGICAL SURGERY

## 2018-12-17 PROCEDURE — 25010000002 DEXAMETHASONE PER 1 MG: Performed by: NURSE ANESTHETIST, CERTIFIED REGISTERED

## 2018-12-17 PROCEDURE — 25010000002 ALBUMIN HUMAN 5% PER 50 ML

## 2018-12-17 PROCEDURE — 86850 RBC ANTIBODY SCREEN: CPT | Performed by: NEUROLOGICAL SURGERY

## 2018-12-17 PROCEDURE — 85027 COMPLETE CBC AUTOMATED: CPT | Performed by: ANESTHESIOLOGY

## 2018-12-17 PROCEDURE — 25010000002 MIDAZOLAM PER 1 MG: Performed by: ANESTHESIOLOGY

## 2018-12-17 PROCEDURE — 86900 BLOOD TYPING SEROLOGIC ABO: CPT | Performed by: NEUROLOGICAL SURGERY

## 2018-12-17 PROCEDURE — 22853 INSJ BIOMECHANICAL DEVICE: CPT | Performed by: NEUROLOGICAL SURGERY

## 2018-12-17 PROCEDURE — 25010000003 CEFAZOLIN IN DEXTROSE 2-4 GM/100ML-% SOLUTION: Performed by: NEUROLOGICAL SURGERY

## 2018-12-17 PROCEDURE — 72110 X-RAY EXAM L-2 SPINE 4/>VWS: CPT

## 2018-12-17 PROCEDURE — C1713 ANCHOR/SCREW BN/BN,TIS/BN: HCPCS | Performed by: NEUROLOGICAL SURGERY

## 2018-12-17 PROCEDURE — 0SB20ZZ EXCISION OF LUMBAR VERTEBRAL DISC, OPEN APPROACH: ICD-10-PCS | Performed by: NEUROLOGICAL SURGERY

## 2018-12-17 PROCEDURE — 25010000002 ONDANSETRON PER 1 MG: Performed by: ANESTHESIOLOGY

## 2018-12-17 PROCEDURE — 93005 ELECTROCARDIOGRAM TRACING: CPT | Performed by: ANESTHESIOLOGY

## 2018-12-17 PROCEDURE — 0SG00AJ FUSION OF LUMBAR VERTEBRAL JOINT WITH INTERBODY FUSION DEVICE, POSTERIOR APPROACH, ANTERIOR COLUMN, OPEN APPROACH: ICD-10-PCS | Performed by: NEUROLOGICAL SURGERY

## 2018-12-17 PROCEDURE — 25010000002 FENTANYL CITRATE (PF) 100 MCG/2ML SOLUTION: Performed by: NURSE ANESTHETIST, CERTIFIED REGISTERED

## 2018-12-17 PROCEDURE — 22633 ARTHRD CMBN 1NTRSPC LUMBAR: CPT | Performed by: NEUROLOGICAL SURGERY

## 2018-12-17 PROCEDURE — 82962 GLUCOSE BLOOD TEST: CPT

## 2018-12-17 PROCEDURE — 63048 LAM FACETEC &FORAMOT EA ADDL: CPT | Performed by: NEUROLOGICAL SURGERY

## 2018-12-17 PROCEDURE — 80048 BASIC METABOLIC PNL TOTAL CA: CPT | Performed by: ANESTHESIOLOGY

## 2018-12-17 PROCEDURE — 61783 SCAN PROC SPINAL: CPT | Performed by: NEUROLOGICAL SURGERY

## 2018-12-17 PROCEDURE — 22840 INSERT SPINE FIXATION DEVICE: CPT | Performed by: NEUROLOGICAL SURGERY

## 2018-12-17 PROCEDURE — P9041 ALBUMIN (HUMAN),5%, 50ML: HCPCS

## 2018-12-17 PROCEDURE — 25010000002 PROPOFOL 10 MG/ML EMULSION: Performed by: NURSE ANESTHETIST, CERTIFIED REGISTERED

## 2018-12-17 PROCEDURE — 86901 BLOOD TYPING SEROLOGIC RH(D): CPT | Performed by: NEUROLOGICAL SURGERY

## 2018-12-17 PROCEDURE — C1769 GUIDE WIRE: HCPCS | Performed by: NEUROLOGICAL SURGERY

## 2018-12-17 DEVICE — SCREW 54840016565 CN MAS 6.5X65 CC
Type: IMPLANTABLE DEVICE | Site: SPINE LUMBAR | Status: FUNCTIONAL
Brand: CD HORIZON® SPINAL SYSTEM

## 2018-12-17 DEVICE — SPACER 7770828 ELEVATE STD 28X8MM
Type: IMPLANTABLE DEVICE | Site: SPINE LUMBAR | Status: FUNCTIONAL
Brand: ELEVATE™ SPINAL SYSTEM

## 2018-12-17 DEVICE — WAX BONE HEMO NAT 2.5G: Type: IMPLANTABLE DEVICE | Site: SPINE LUMBAR | Status: FUNCTIONAL

## 2018-12-17 DEVICE — SEALANT WND FIBRIN TISSEEL VAPOR/HEAT/PREFIL/SYR 10ML: Type: IMPLANTABLE DEVICE | Site: SPINE LUMBAR | Status: FUNCTIONAL

## 2018-12-17 RX ORDER — ROCURONIUM BROMIDE 10 MG/ML
INJECTION, SOLUTION INTRAVENOUS AS NEEDED
Status: DISCONTINUED | OUTPATIENT
Start: 2018-12-17 | End: 2018-12-17 | Stop reason: SURG

## 2018-12-17 RX ORDER — ONDANSETRON 2 MG/ML
INJECTION INTRAMUSCULAR; INTRAVENOUS AS NEEDED
Status: DISCONTINUED | OUTPATIENT
Start: 2018-12-17 | End: 2018-12-17 | Stop reason: SURG

## 2018-12-17 RX ORDER — LIDOCAINE HYDROCHLORIDE 10 MG/ML
0.5 INJECTION, SOLUTION EPIDURAL; INFILTRATION; INTRACAUDAL; PERINEURAL ONCE AS NEEDED
Status: DISCONTINUED | OUTPATIENT
Start: 2018-12-17 | End: 2018-12-17 | Stop reason: HOSPADM

## 2018-12-17 RX ORDER — ONDANSETRON 2 MG/ML
4 INJECTION INTRAMUSCULAR; INTRAVENOUS ONCE AS NEEDED
Status: DISCONTINUED | OUTPATIENT
Start: 2018-12-17 | End: 2018-12-18 | Stop reason: HOSPADM

## 2018-12-17 RX ORDER — FENTANYL CITRATE 50 UG/ML
50 INJECTION, SOLUTION INTRAMUSCULAR; INTRAVENOUS
Status: DISCONTINUED | OUTPATIENT
Start: 2018-12-17 | End: 2018-12-18 | Stop reason: HOSPADM

## 2018-12-17 RX ORDER — LABETALOL HYDROCHLORIDE 5 MG/ML
5 INJECTION, SOLUTION INTRAVENOUS
Status: DISCONTINUED | OUTPATIENT
Start: 2018-12-17 | End: 2018-12-18 | Stop reason: HOSPADM

## 2018-12-17 RX ORDER — OXYCODONE AND ACETAMINOPHEN 7.5; 325 MG/1; MG/1
1 TABLET ORAL ONCE AS NEEDED
Status: DISCONTINUED | OUTPATIENT
Start: 2018-12-17 | End: 2018-12-18 | Stop reason: HOSPADM

## 2018-12-17 RX ORDER — PROMETHAZINE HYDROCHLORIDE 25 MG/1
25 TABLET ORAL ONCE AS NEEDED
Status: DISCONTINUED | OUTPATIENT
Start: 2018-12-17 | End: 2018-12-18 | Stop reason: HOSPADM

## 2018-12-17 RX ORDER — HYDRALAZINE HYDROCHLORIDE 20 MG/ML
5 INJECTION INTRAMUSCULAR; INTRAVENOUS
Status: DISCONTINUED | OUTPATIENT
Start: 2018-12-17 | End: 2018-12-18 | Stop reason: HOSPADM

## 2018-12-17 RX ORDER — FENTANYL CITRATE 50 UG/ML
50 INJECTION, SOLUTION INTRAMUSCULAR; INTRAVENOUS
Status: DISCONTINUED | OUTPATIENT
Start: 2018-12-17 | End: 2018-12-17 | Stop reason: HOSPADM

## 2018-12-17 RX ORDER — ALBUMIN, HUMAN INJ 5% 5 %
SOLUTION INTRAVENOUS
Status: COMPLETED
Start: 2018-12-17 | End: 2018-12-17

## 2018-12-17 RX ORDER — PROPOFOL 10 MG/ML
VIAL (ML) INTRAVENOUS AS NEEDED
Status: DISCONTINUED | OUTPATIENT
Start: 2018-12-17 | End: 2018-12-17 | Stop reason: SURG

## 2018-12-17 RX ORDER — SODIUM CHLORIDE, SODIUM LACTATE, POTASSIUM CHLORIDE, CALCIUM CHLORIDE 600; 310; 30; 20 MG/100ML; MG/100ML; MG/100ML; MG/100ML
9 INJECTION, SOLUTION INTRAVENOUS CONTINUOUS
Status: DISCONTINUED | OUTPATIENT
Start: 2018-12-17 | End: 2018-12-20 | Stop reason: HOSPADM

## 2018-12-17 RX ORDER — PROMETHAZINE HYDROCHLORIDE 25 MG/1
25 SUPPOSITORY RECTAL ONCE AS NEEDED
Status: DISCONTINUED | OUTPATIENT
Start: 2018-12-17 | End: 2018-12-18 | Stop reason: HOSPADM

## 2018-12-17 RX ORDER — FLUMAZENIL 0.1 MG/ML
0.2 INJECTION INTRAVENOUS AS NEEDED
Status: DISCONTINUED | OUTPATIENT
Start: 2018-12-17 | End: 2018-12-18 | Stop reason: HOSPADM

## 2018-12-17 RX ORDER — SODIUM CHLORIDE, SODIUM LACTATE, POTASSIUM CHLORIDE, CALCIUM CHLORIDE 600; 310; 30; 20 MG/100ML; MG/100ML; MG/100ML; MG/100ML
100 INJECTION, SOLUTION INTRAVENOUS CONTINUOUS
Status: DISCONTINUED | OUTPATIENT
Start: 2018-12-17 | End: 2018-12-18

## 2018-12-17 RX ORDER — NALOXONE HCL 0.4 MG/ML
0.2 VIAL (ML) INJECTION AS NEEDED
Status: DISCONTINUED | OUTPATIENT
Start: 2018-12-17 | End: 2018-12-18 | Stop reason: HOSPADM

## 2018-12-17 RX ORDER — PROMETHAZINE HYDROCHLORIDE 25 MG/ML
12.5 INJECTION, SOLUTION INTRAMUSCULAR; INTRAVENOUS ONCE AS NEEDED
Status: DISCONTINUED | OUTPATIENT
Start: 2018-12-17 | End: 2018-12-18 | Stop reason: HOSPADM

## 2018-12-17 RX ORDER — MIDAZOLAM HYDROCHLORIDE 1 MG/ML
2 INJECTION INTRAMUSCULAR; INTRAVENOUS
Status: DISCONTINUED | OUTPATIENT
Start: 2018-12-17 | End: 2018-12-17 | Stop reason: HOSPADM

## 2018-12-17 RX ORDER — HYDROMORPHONE HYDROCHLORIDE 1 MG/ML
0.5 INJECTION, SOLUTION INTRAMUSCULAR; INTRAVENOUS; SUBCUTANEOUS
Status: DISCONTINUED | OUTPATIENT
Start: 2018-12-17 | End: 2018-12-18 | Stop reason: HOSPADM

## 2018-12-17 RX ORDER — MIDAZOLAM HYDROCHLORIDE 1 MG/ML
1 INJECTION INTRAMUSCULAR; INTRAVENOUS
Status: DISCONTINUED | OUTPATIENT
Start: 2018-12-17 | End: 2018-12-17 | Stop reason: HOSPADM

## 2018-12-17 RX ORDER — HYDROCODONE BITARTRATE AND ACETAMINOPHEN 7.5; 325 MG/1; MG/1
1 TABLET ORAL ONCE AS NEEDED
Status: DISCONTINUED | OUTPATIENT
Start: 2018-12-17 | End: 2018-12-18 | Stop reason: HOSPADM

## 2018-12-17 RX ORDER — LIDOCAINE HYDROCHLORIDE 20 MG/ML
INJECTION, SOLUTION INFILTRATION; PERINEURAL AS NEEDED
Status: DISCONTINUED | OUTPATIENT
Start: 2018-12-17 | End: 2018-12-17 | Stop reason: SURG

## 2018-12-17 RX ORDER — SODIUM CHLORIDE 0.9 % (FLUSH) 0.9 %
1-10 SYRINGE (ML) INJECTION AS NEEDED
Status: DISCONTINUED | OUTPATIENT
Start: 2018-12-17 | End: 2018-12-17 | Stop reason: HOSPADM

## 2018-12-17 RX ORDER — EPHEDRINE SULFATE 50 MG/ML
5 INJECTION, SOLUTION INTRAVENOUS ONCE AS NEEDED
Status: COMPLETED | OUTPATIENT
Start: 2018-12-17 | End: 2018-12-17

## 2018-12-17 RX ORDER — DIPHENHYDRAMINE HCL 25 MG
25 CAPSULE ORAL
Status: DISCONTINUED | OUTPATIENT
Start: 2018-12-17 | End: 2018-12-18 | Stop reason: HOSPADM

## 2018-12-17 RX ORDER — FENTANYL CITRATE 50 UG/ML
INJECTION, SOLUTION INTRAMUSCULAR; INTRAVENOUS AS NEEDED
Status: DISCONTINUED | OUTPATIENT
Start: 2018-12-17 | End: 2018-12-17 | Stop reason: SURG

## 2018-12-17 RX ORDER — FAMOTIDINE 10 MG/ML
20 INJECTION, SOLUTION INTRAVENOUS ONCE
Status: COMPLETED | OUTPATIENT
Start: 2018-12-17 | End: 2018-12-17

## 2018-12-17 RX ORDER — EPHEDRINE SULFATE 50 MG/ML
INJECTION, SOLUTION INTRAVENOUS AS NEEDED
Status: DISCONTINUED | OUTPATIENT
Start: 2018-12-17 | End: 2018-12-17 | Stop reason: SURG

## 2018-12-17 RX ORDER — DIPHENHYDRAMINE HYDROCHLORIDE 50 MG/ML
12.5 INJECTION INTRAMUSCULAR; INTRAVENOUS
Status: DISCONTINUED | OUTPATIENT
Start: 2018-12-17 | End: 2018-12-18 | Stop reason: HOSPADM

## 2018-12-17 RX ORDER — LIDOCAINE HYDROCHLORIDE 40 MG/ML
SOLUTION TOPICAL AS NEEDED
Status: DISCONTINUED | OUTPATIENT
Start: 2018-12-17 | End: 2018-12-17 | Stop reason: SURG

## 2018-12-17 RX ORDER — ALBUMIN, HUMAN INJ 5% 5 %
250 SOLUTION INTRAVENOUS ONCE
Status: COMPLETED | OUTPATIENT
Start: 2018-12-17 | End: 2018-12-17

## 2018-12-17 RX ORDER — DEXAMETHASONE SODIUM PHOSPHATE 10 MG/ML
INJECTION INTRAMUSCULAR; INTRAVENOUS AS NEEDED
Status: DISCONTINUED | OUTPATIENT
Start: 2018-12-17 | End: 2018-12-17 | Stop reason: SURG

## 2018-12-17 RX ORDER — CEFAZOLIN SODIUM 2 G/100ML
2 INJECTION, SOLUTION INTRAVENOUS ONCE
Status: COMPLETED | OUTPATIENT
Start: 2018-12-17 | End: 2018-12-17

## 2018-12-17 RX ORDER — ACETAMINOPHEN 325 MG/1
650 TABLET ORAL ONCE AS NEEDED
Status: DISCONTINUED | OUTPATIENT
Start: 2018-12-17 | End: 2018-12-18 | Stop reason: HOSPADM

## 2018-12-17 RX ADMIN — PHENYLEPHRINE HYDROCHLORIDE 100 MCG: 10 INJECTION INTRAVENOUS at 20:00

## 2018-12-17 RX ADMIN — ALBUMIN (HUMAN): 0.05 SOLUTION INTRAVENOUS at 21:40

## 2018-12-17 RX ADMIN — DEXAMETHASONE SODIUM PHOSPHATE 8 MG: 10 INJECTION INTRAMUSCULAR; INTRAVENOUS at 16:10

## 2018-12-17 RX ADMIN — SODIUM CHLORIDE, POTASSIUM CHLORIDE, SODIUM LACTATE AND CALCIUM CHLORIDE 100 ML/HR: 600; 310; 30; 20 INJECTION, SOLUTION INTRAVENOUS at 12:29

## 2018-12-17 RX ADMIN — PHENYLEPHRINE HYDROCHLORIDE 0.5 MCG/KG/MIN: 10 INJECTION INTRAVENOUS at 21:45

## 2018-12-17 RX ADMIN — MIDAZOLAM HYDROCHLORIDE 1 MG: 2 INJECTION, SOLUTION INTRAMUSCULAR; INTRAVENOUS at 13:08

## 2018-12-17 RX ADMIN — PROPOFOL 200 MG: 10 INJECTION, EMULSION INTRAVENOUS at 15:42

## 2018-12-17 RX ADMIN — EPHEDRINE SULFATE 5 MG: 50 INJECTION INTRAMUSCULAR; INTRAVENOUS; SUBCUTANEOUS at 16:37

## 2018-12-17 RX ADMIN — EPHEDRINE SULFATE 15 MG: 50 INJECTION INTRAMUSCULAR; INTRAVENOUS; SUBCUTANEOUS at 15:47

## 2018-12-17 RX ADMIN — EPHEDRINE SULFATE 5 MG: 50 INJECTION, SOLUTION INTRAVENOUS at 21:20

## 2018-12-17 RX ADMIN — CEFAZOLIN SODIUM 2 G: 2 INJECTION, SOLUTION INTRAVENOUS at 15:46

## 2018-12-17 RX ADMIN — EPHEDRINE SULFATE 5 MG: 50 INJECTION INTRAMUSCULAR; INTRAVENOUS; SUBCUTANEOUS at 16:57

## 2018-12-17 RX ADMIN — SODIUM CHLORIDE, POTASSIUM CHLORIDE, SODIUM LACTATE AND CALCIUM CHLORIDE: 600; 310; 30; 20 INJECTION, SOLUTION INTRAVENOUS at 19:01

## 2018-12-17 RX ADMIN — SUGAMMADEX 200 MG: 100 INJECTION, SOLUTION INTRAVENOUS at 16:38

## 2018-12-17 RX ADMIN — PHENYLEPHRINE HYDROCHLORIDE 100 MCG: 10 INJECTION INTRAVENOUS at 16:57

## 2018-12-17 RX ADMIN — ALBUMIN, HUMAN INJ 5%: 5 SOLUTION at 21:40

## 2018-12-17 RX ADMIN — LIDOCAINE HYDROCHLORIDE 80 MG: 20 INJECTION, SOLUTION INFILTRATION; PERINEURAL at 15:42

## 2018-12-17 RX ADMIN — EPHEDRINE SULFATE 5 MG: 50 INJECTION INTRAMUSCULAR; INTRAVENOUS; SUBCUTANEOUS at 16:47

## 2018-12-17 RX ADMIN — ONDANSETRON 4 MG: 2 INJECTION INTRAMUSCULAR; INTRAVENOUS at 20:47

## 2018-12-17 RX ADMIN — EPHEDRINE SULFATE 5 MG: 50 INJECTION INTRAMUSCULAR; INTRAVENOUS; SUBCUTANEOUS at 16:51

## 2018-12-17 RX ADMIN — PHENYLEPHRINE HYDROCHLORIDE 100 MCG: 10 INJECTION INTRAVENOUS at 18:02

## 2018-12-17 RX ADMIN — SODIUM CHLORIDE, POTASSIUM CHLORIDE, SODIUM LACTATE AND CALCIUM CHLORIDE: 600; 310; 30; 20 INJECTION, SOLUTION INTRAVENOUS at 16:13

## 2018-12-17 RX ADMIN — ROCURONIUM BROMIDE 50 MG: 10 INJECTION INTRAVENOUS at 15:42

## 2018-12-17 RX ADMIN — PHENYLEPHRINE HYDROCHLORIDE 200 MCG: 10 INJECTION INTRAVENOUS at 20:59

## 2018-12-17 RX ADMIN — FENTANYL CITRATE 100 MCG: 50 INJECTION INTRAMUSCULAR; INTRAVENOUS at 15:42

## 2018-12-17 RX ADMIN — PHENYLEPHRINE HYDROCHLORIDE 100 MCG: 10 INJECTION INTRAVENOUS at 16:53

## 2018-12-17 RX ADMIN — LIDOCAINE HYDROCHLORIDE 1 EACH: 40 SOLUTION TOPICAL at 15:44

## 2018-12-17 RX ADMIN — FAMOTIDINE 20 MG: 10 INJECTION, SOLUTION INTRAVENOUS at 13:07

## 2018-12-17 RX ADMIN — FENTANYL CITRATE 100 MCG: 50 INJECTION INTRAMUSCULAR; INTRAVENOUS at 16:40

## 2018-12-17 NOTE — ANESTHESIA PREPROCEDURE EVALUATION
Anesthesia Evaluation     Patient summary reviewed and Nursing notes reviewed   NPO Solid Status: > 8 hours  NPO Liquid Status: > 4 hours           Airway   Mallampati: II  Neck ROM: full  No difficulty expected  Dental      Comment: Full upper plate, which locks to an implant, he can take the plate out    Pulmonary     breath sounds clear to auscultation  (+) a smoker Former,   Cardiovascular     Rhythm: regular    (+) hypertension, hyperlipidemia,       Neuro/Psych  GI/Hepatic/Renal/Endo    (+)  GERD,  diabetes mellitus,     Musculoskeletal     Abdominal    Substance History      OB/GYN          Other   (+) arthritis                   Anesthesia Plan    ASA 2     general   (Prone position discussed)  intravenous induction   Anesthetic plan, all risks, benefits, and alternatives have been provided, discussed and informed consent has been obtained with: patient.

## 2018-12-17 NOTE — ANESTHESIA PROCEDURE NOTES
ANESTHESIA INTUBATION  Urgency: elective    Airway not difficult    General Information and Staff    Patient location during procedure: OR  Anesthesiologist: Denny Orellana MD  CRNA: Ariana Webster CRNA    Indications and Patient Condition  Indications for airway management: airway protection    Preoxygenated: yes  MILS not maintained throughout  Mask difficulty assessment: 1 - vent by mask    Final Airway Details  Final airway type: endotracheal airway      Successful airway: ETT  Cuffed: yes   Successful intubation technique: direct laryngoscopy  Endotracheal tube insertion site: oral  Blade: Debo  Blade size: 3  ETT size (mm): 7.5  Cormack-Lehane Classification: grade I - full view of glottis  Placement verified by: chest auscultation and capnometry   Measured from: lips  ETT to lips (cm): 22  Number of attempts at approach: 1    Additional Comments  Dentition intact and unchanged. CBEBS.  +ETCO2.

## 2018-12-18 LAB
ANION GAP SERPL CALCULATED.3IONS-SCNC: 15.4 MMOL/L
ANION GAP SERPL CALCULATED.3IONS-SCNC: 18.8 MMOL/L
BASOPHILS # BLD AUTO: 0.01 10*3/MM3 (ref 0–0.2)
BASOPHILS NFR BLD AUTO: 0.1 % (ref 0–1.5)
BUN BLD-MCNC: 29 MG/DL (ref 8–23)
BUN BLD-MCNC: 30 MG/DL (ref 8–23)
BUN/CREAT SERPL: 17.4 (ref 7–25)
BUN/CREAT SERPL: 17.9 (ref 7–25)
CALCIUM SPEC-SCNC: 8.4 MG/DL (ref 8.6–10.5)
CALCIUM SPEC-SCNC: 8.6 MG/DL (ref 8.6–10.5)
CHLORIDE SERPL-SCNC: 100 MMOL/L (ref 98–107)
CHLORIDE SERPL-SCNC: 99 MMOL/L (ref 98–107)
CO2 SERPL-SCNC: 15.2 MMOL/L (ref 22–29)
CO2 SERPL-SCNC: 20.6 MMOL/L (ref 22–29)
CREAT BLD-MCNC: 1.62 MG/DL (ref 0.76–1.27)
CREAT BLD-MCNC: 1.72 MG/DL (ref 0.76–1.27)
DEPRECATED RDW RBC AUTO: 46.5 FL (ref 37–54)
EOSINOPHIL # BLD AUTO: 0.01 10*3/MM3 (ref 0–0.7)
EOSINOPHIL NFR BLD AUTO: 0.1 % (ref 0.3–6.2)
ERYTHROCYTE [DISTWIDTH] IN BLOOD BY AUTOMATED COUNT: 14.1 % (ref 11.5–14.5)
GFR SERPL CREATININE-BSD FRML MDRD: 39 ML/MIN/1.73
GFR SERPL CREATININE-BSD FRML MDRD: 42 ML/MIN/1.73
GLUCOSE BLD-MCNC: 338 MG/DL (ref 65–99)
GLUCOSE BLD-MCNC: 352 MG/DL (ref 65–99)
GLUCOSE BLDC GLUCOMTR-MCNC: 161 MG/DL (ref 70–130)
GLUCOSE BLDC GLUCOMTR-MCNC: 273 MG/DL (ref 70–130)
GLUCOSE BLDC GLUCOMTR-MCNC: 289 MG/DL (ref 70–130)
GLUCOSE BLDC GLUCOMTR-MCNC: 332 MG/DL (ref 70–130)
HCT VFR BLD AUTO: 37.5 % (ref 40.4–52.2)
HGB BLD-MCNC: 11.6 G/DL (ref 13.7–17.6)
IMM GRANULOCYTES # BLD: 0.04 10*3/MM3 (ref 0–0.03)
IMM GRANULOCYTES NFR BLD: 0.3 % (ref 0–0.5)
LYMPHOCYTES # BLD AUTO: 0.73 10*3/MM3 (ref 0.9–4.8)
LYMPHOCYTES NFR BLD AUTO: 5.1 % (ref 19.6–45.3)
MCH RBC QN AUTO: 28 PG (ref 27–32.7)
MCHC RBC AUTO-ENTMCNC: 30.9 G/DL (ref 32.6–36.4)
MCV RBC AUTO: 90.6 FL (ref 79.8–96.2)
MONOCYTES # BLD AUTO: 1.14 10*3/MM3 (ref 0.2–1.2)
MONOCYTES NFR BLD AUTO: 8 % (ref 5–12)
NEUTROPHILS # BLD AUTO: 12.37 10*3/MM3 (ref 1.9–8.1)
NEUTROPHILS NFR BLD AUTO: 86.4 % (ref 42.7–76)
PLATELET # BLD AUTO: 169 10*3/MM3 (ref 140–500)
PMV BLD AUTO: 10.2 FL (ref 6–12)
POTASSIUM BLD-SCNC: 5.2 MMOL/L (ref 3.5–5.2)
POTASSIUM BLD-SCNC: 5.9 MMOL/L (ref 3.5–5.2)
RBC # BLD AUTO: 4.14 10*6/MM3 (ref 4.6–6)
SODIUM BLD-SCNC: 134 MMOL/L (ref 136–145)
SODIUM BLD-SCNC: 135 MMOL/L (ref 136–145)
WBC NRBC COR # BLD: 14.3 10*3/MM3 (ref 4.5–10.7)

## 2018-12-18 PROCEDURE — 82962 GLUCOSE BLOOD TEST: CPT

## 2018-12-18 PROCEDURE — 63710000001 INSULIN LISPRO (HUMAN) PER 5 UNITS: Performed by: INTERNAL MEDICINE

## 2018-12-18 PROCEDURE — 99024 POSTOP FOLLOW-UP VISIT: CPT | Performed by: NURSE PRACTITIONER

## 2018-12-18 PROCEDURE — 97162 PT EVAL MOD COMPLEX 30 MIN: CPT

## 2018-12-18 PROCEDURE — 25010000002 HYDROMORPHONE PER 4 MG: Performed by: NEUROLOGICAL SURGERY

## 2018-12-18 PROCEDURE — 25010000003 CEFAZOLIN IN DEXTROSE 2-4 GM/100ML-% SOLUTION: Performed by: NEUROLOGICAL SURGERY

## 2018-12-18 PROCEDURE — 63710000001 INSULIN GLARGINE PER 5 UNITS: Performed by: INTERNAL MEDICINE

## 2018-12-18 PROCEDURE — 94799 UNLISTED PULMONARY SVC/PX: CPT

## 2018-12-18 PROCEDURE — 97110 THERAPEUTIC EXERCISES: CPT

## 2018-12-18 PROCEDURE — 25010000002 PHENYLEPHRINE PER 1 ML

## 2018-12-18 PROCEDURE — 80048 BASIC METABOLIC PNL TOTAL CA: CPT | Performed by: NEUROLOGICAL SURGERY

## 2018-12-18 PROCEDURE — 85025 COMPLETE CBC W/AUTO DIFF WBC: CPT | Performed by: NEUROLOGICAL SURGERY

## 2018-12-18 RX ORDER — HYDROCODONE BITARTRATE AND ACETAMINOPHEN 5; 325 MG/1; MG/1
1 TABLET ORAL EVERY 4 HOURS PRN
Status: DISCONTINUED | OUTPATIENT
Start: 2018-12-18 | End: 2018-12-20 | Stop reason: HOSPADM

## 2018-12-18 RX ORDER — NALOXONE HCL 0.4 MG/ML
0.1 VIAL (ML) INJECTION
Status: DISCONTINUED | OUTPATIENT
Start: 2018-12-18 | End: 2018-12-20 | Stop reason: HOSPADM

## 2018-12-18 RX ORDER — SODIUM CHLORIDE, SODIUM LACTATE, POTASSIUM CHLORIDE, CALCIUM CHLORIDE 600; 310; 30; 20 MG/100ML; MG/100ML; MG/100ML; MG/100ML
75 INJECTION, SOLUTION INTRAVENOUS CONTINUOUS
Status: DISCONTINUED | OUTPATIENT
Start: 2018-12-18 | End: 2018-12-20

## 2018-12-18 RX ORDER — BISACODYL 5 MG/1
10 TABLET, DELAYED RELEASE ORAL DAILY PRN
Status: DISCONTINUED | OUTPATIENT
Start: 2018-12-18 | End: 2018-12-20 | Stop reason: HOSPADM

## 2018-12-18 RX ORDER — CHOLECALCIFEROL (VITAMIN D3) 125 MCG
5 CAPSULE ORAL NIGHTLY PRN
Status: DISCONTINUED | OUTPATIENT
Start: 2018-12-18 | End: 2018-12-20 | Stop reason: HOSPADM

## 2018-12-18 RX ORDER — CEFAZOLIN SODIUM 2 G/100ML
2 INJECTION, SOLUTION INTRAVENOUS EVERY 8 HOURS
Status: COMPLETED | OUTPATIENT
Start: 2018-12-18 | End: 2018-12-18

## 2018-12-18 RX ORDER — HYDROMORPHONE HYDROCHLORIDE 1 MG/ML
0.5 INJECTION, SOLUTION INTRAMUSCULAR; INTRAVENOUS; SUBCUTANEOUS
Status: DISCONTINUED | OUTPATIENT
Start: 2018-12-18 | End: 2018-12-20 | Stop reason: HOSPADM

## 2018-12-18 RX ORDER — ONDANSETRON 4 MG/1
4 TABLET, ORALLY DISINTEGRATING ORAL EVERY 6 HOURS PRN
Status: DISCONTINUED | OUTPATIENT
Start: 2018-12-18 | End: 2018-12-20 | Stop reason: HOSPADM

## 2018-12-18 RX ORDER — ACETAMINOPHEN 325 MG/1
650 TABLET ORAL EVERY 6 HOURS PRN
Status: DISCONTINUED | OUTPATIENT
Start: 2018-12-18 | End: 2018-12-20 | Stop reason: HOSPADM

## 2018-12-18 RX ORDER — DEXTROSE MONOHYDRATE 25 G/50ML
25 INJECTION, SOLUTION INTRAVENOUS
Status: DISCONTINUED | OUTPATIENT
Start: 2018-12-18 | End: 2018-12-20 | Stop reason: HOSPADM

## 2018-12-18 RX ORDER — HYDROCODONE BITARTRATE AND ACETAMINOPHEN 10; 325 MG/1; MG/1
1 TABLET ORAL EVERY 4 HOURS PRN
Status: DISCONTINUED | OUTPATIENT
Start: 2018-12-18 | End: 2018-12-20 | Stop reason: HOSPADM

## 2018-12-18 RX ORDER — ONDANSETRON 2 MG/ML
4 INJECTION INTRAMUSCULAR; INTRAVENOUS EVERY 6 HOURS PRN
Status: DISCONTINUED | OUTPATIENT
Start: 2018-12-18 | End: 2018-12-20 | Stop reason: HOSPADM

## 2018-12-18 RX ORDER — INSULIN GLARGINE 100 [IU]/ML
12 INJECTION, SOLUTION SUBCUTANEOUS NIGHTLY
Status: DISCONTINUED | OUTPATIENT
Start: 2018-12-18 | End: 2018-12-20 | Stop reason: HOSPADM

## 2018-12-18 RX ORDER — NICOTINE POLACRILEX 4 MG
15 LOZENGE BUCCAL
Status: DISCONTINUED | OUTPATIENT
Start: 2018-12-18 | End: 2018-12-20 | Stop reason: HOSPADM

## 2018-12-18 RX ORDER — ONDANSETRON 4 MG/1
4 TABLET, FILM COATED ORAL EVERY 6 HOURS PRN
Status: DISCONTINUED | OUTPATIENT
Start: 2018-12-18 | End: 2018-12-20 | Stop reason: HOSPADM

## 2018-12-18 RX ORDER — GLIPIZIDE 10 MG/1
10 TABLET ORAL
Status: DISCONTINUED | OUTPATIENT
Start: 2018-12-18 | End: 2018-12-20 | Stop reason: HOSPADM

## 2018-12-18 RX ORDER — DOCUSATE SODIUM 100 MG/1
100 CAPSULE, LIQUID FILLED ORAL 2 TIMES DAILY PRN
Status: DISCONTINUED | OUTPATIENT
Start: 2018-12-18 | End: 2018-12-20

## 2018-12-18 RX ADMIN — INSULIN LISPRO 5 UNITS: 100 INJECTION, SOLUTION INTRAVENOUS; SUBCUTANEOUS at 07:58

## 2018-12-18 RX ADMIN — GLIPIZIDE 10 MG: 10 TABLET ORAL at 07:58

## 2018-12-18 RX ADMIN — CEFAZOLIN SODIUM 2 G: 2 INJECTION, SOLUTION INTRAVENOUS at 10:57

## 2018-12-18 RX ADMIN — INSULIN LISPRO 4 UNITS: 100 INJECTION, SOLUTION INTRAVENOUS; SUBCUTANEOUS at 12:16

## 2018-12-18 RX ADMIN — HYDROCODONE BITARTRATE AND ACETAMINOPHEN 1 TABLET: 10; 325 TABLET ORAL at 21:01

## 2018-12-18 RX ADMIN — Medication 5 MG: at 23:37

## 2018-12-18 RX ADMIN — METFORMIN HYDROCHLORIDE 1000 MG: 1000 TABLET ORAL at 07:58

## 2018-12-18 RX ADMIN — SODIUM CHLORIDE, POTASSIUM CHLORIDE, SODIUM LACTATE AND CALCIUM CHLORIDE 9 ML/HR: 600; 310; 30; 20 INJECTION, SOLUTION INTRAVENOUS at 00:37

## 2018-12-18 RX ADMIN — INSULIN GLARGINE 12 UNITS: 100 INJECTION, SOLUTION SUBCUTANEOUS at 21:19

## 2018-12-18 RX ADMIN — CEFAZOLIN SODIUM 2 G: 2 INJECTION, SOLUTION INTRAVENOUS at 02:46

## 2018-12-18 RX ADMIN — ACETAMINOPHEN 650 MG: 325 TABLET, FILM COATED ORAL at 10:56

## 2018-12-18 RX ADMIN — HYDROMORPHONE HYDROCHLORIDE 0.5 MG: 1 INJECTION, SOLUTION INTRAMUSCULAR; INTRAVENOUS; SUBCUTANEOUS at 02:45

## 2018-12-18 RX ADMIN — INSULIN LISPRO 4 UNITS: 100 INJECTION, SOLUTION INTRAVENOUS; SUBCUTANEOUS at 17:47

## 2018-12-18 NOTE — CONSULTS
Pulmonary Consultation     Patient Name: Claudio Haney  Age/Sex: 78 y.o. male  : 1940  MRN: [id number]    Date of Admission: 2018  Date of Encounter Visit: 18  Encounter Provider: Rajan Aaron MD  Referring Provider: Denis Flores MD  Place of Service: [facility]  Patient Care Team:  SINCERE Marina MD as PCP - General (General Practice)      Subjective:     Consulted for: Diabetes/hypertension and hyperkalemia management    Chief Complaint: Patient just came out of surgery after having disc surgery by Dr. Christian Flores    History of Present Illness:  Claudio Haney is a 78 y.o. male with severe lumbar spinal stenosis that was better enough to require surgical intervention by neurosurgery.  He came in for selective procedure scheduled for 18, patient was transferred to the ICU from PACU late in the evening for further evaluation.  His blood test showed some elevated potassium level, his blood sugar was slightly elevated however patient was hemodynamic stable awake and responsive with no respiratory compromise or chest pain or arrhythmias.  Risk factors all comorbidities include hypertension and type 2 diabetes mellitus.  His baseline creatinine is slightly abnormal at 1.2  Patient denies any history of pneumonia or asthma or COPD or recurrent lung infection, he is a nonsmoker.  Denies any GI or  complaints, his diabetes is not very well controlled his last hemoglobin A1c was 7.9.  Denies any dysuria or hematuria or any problem with urine flow and was not aware of any previous problem with his kidneys.    Pulmonary Functions Testing Results:    No results found for: FEV1, FVC, QAN7ISD, TLC, DLCO    Review of Systems:   Review of Systems   Constitutional: Negative for fever.   HENT: Negative for trouble swallowing.    Respiratory: Negative for cough and wheezing.    Cardiovascular: Negative for chest pain and leg swelling.   Musculoskeletal: Positive for back pain (better with  rest) and gait problem.        Muscle atrophy noted in right leg   Neurological: Positive for weakness. Negative for numbness.   The rest of the 12 system review is otherwise was negative if not mentioned    Past Medical History:  Past Medical History:   Diagnosis Date   • Arthritis    • Diabetes mellitus (CMS/HCC)    • GERD (gastroesophageal reflux disease)    • Hyperlipidemia    • Hypertension        Past Surgical History:   Procedure Laterality Date   • COLONOSCOPY     • KNEE LIGAMENT RECONSTRUCTION Right    • PATELLA SURGERY Left    • PENILE PROSTHESIS IMPLANT     • TONSILLECTOMY         Home Medications:   Medications Prior to Admission   Medication Sig Dispense Refill Last Dose   • amLODIPine-benazepril (LOTREL) 10-40 MG per capsule Take 1 capsule by mouth Daily.   12/16/2018 at 0800   • glimepiride (AMARYL) 4 MG tablet Take 4 mg by mouth Every Morning Before Breakfast.   12/16/2018 at 0800   • Insulin Glargine (TOUJEO SOLOSTAR SC) Inject 12 Units under the skin into the appropriate area as directed Every Night.   12/16/2018 at 2100   • metFORMIN (GLUCOPHAGE) 1000 MG tablet Take 1,000 mg by mouth Daily With Breakfast.   12/16/2018 at 0800   • simvastatin (ZOCOR) 40 MG tablet Take 40 mg by mouth Every Night.   12/16/2018 at 2100   • aspirin 81 MG tablet Take 81 mg by mouth Daily. Pt to stop 1 week before surgery   12/10/2018   • Aspirin-Acetaminophen-Caffeine (EXCEDRIN PO) Take 2 tablets by mouth Daily. As needed (about 2x per week)    12/10/2018   • Semaglutide (OZEMPIC) 0.25 or 0.5 MG/DOSE solution pen-injector Inject 0.5 mL under the skin into the appropriate area as directed 1 (One) Time Per Week. Sat Noon   12/15/2018       Inpatient Medications:  Scheduled Meds:  ceFAZolin 2 g Intravenous Q8H   glipiZIDE 10 mg Oral QAM AC   insulin lispro 0-7 Units Subcutaneous 4x Daily With Meals & Nightly   metFORMIN 1,000 mg Oral Daily With Breakfast   phenylephrine      phenylephrine        Continuous  Infusions:  lactated ringers 100 mL/hr Last Rate: 100 mL/hr (18 1229)   lactated ringers 9 mL/hr Last Rate: 9 mL/hr (18 0037)   lactated ringers 75 mL/hr    phenylephrine 0.5-3 mcg/kg/min Last Rate: 0.8 mcg/kg/min (18 2310)     PRN Meds:.•  bisacodyl  •  dextrose  •  dextrose  •  docusate sodium  •  glucagon (human recombinant)  •  HYDROcodone-acetaminophen  •  HYDROcodone-acetaminophen  •  HYDROmorphone **AND** naloxone  •  ondansetron **OR** ondansetron ODT **OR** ondansetron    Allergies:  No Known Allergies    Past Social History:  Social History     Socioeconomic History   • Marital status:      Spouse name: Not on file   • Number of children: Not on file   • Years of education: Not on file   • Highest education level: Not on file   Occupational History   • Occupation: retired   Tobacco Use   • Smoking status: Former Smoker     Packs/day: 1.50     Years: 30.00     Pack years: 45.00     Types: Cigarettes     Last attempt to quit:      Years since quittin.9   • Smokeless tobacco: Never Used   Substance and Sexual Activity   • Alcohol use: Yes     Comment: 6-8 drinks yearly   • Drug use: No   • Sexual activity: Defer       Past Family History:  Family History   Problem Relation Age of Onset   • Cancer Mother    • Diabetes Brother    • Malig Hyperthermia Neg Hx            Objective:   Temp:  [97.6 °F (36.4 °C)] 97.6 °F (36.4 °C)  Heart Rate:  [61-84] 61  Resp:  [14-18] 18  BP: ()/(36-69) 113/64  SpO2:  [93 %-100 %] 95 %  on  Flow (L/min):  [2-4] 2 Device (Oxygen Therapy): nasal cannula     Intake/Output Summary (Last 24 hours) at 2018 0409  Last data filed at 2018 2310  Gross per 24 hour   Intake 2000 ml   Output 400 ml   Net 1600 ml     Body mass index is 25.44 kg/m².      18  1150   Weight: 82.8 kg (182 lb 7 oz)     Weight change:     Physical Exam:   Physical Exam   General:    No acute distress, alert and oriented x4, pleasant                   Head:     Normocephalic, atraumatic.   Eyes:          Conjunctivae and sclerae normal, no icterus, PERRLA   Throat:   No oral lesions, no thrush, oral mucosa moist.    Neck:   Supple, trachea midline.   Lungs:     Normal chest on inspection, CTAB, no wheezes. No rhonchi. No crackles. Respirations regular, even and unlabored.      Heart:    Regular rhythm and normal rate.  No murmurs, gallops, or rubs noted.   Abdomen:     Soft, non-tender, non-distended, positive bowel sounds.    Extremities:   No clubbing, cyanosis, or edema.  Moving upper extremity, I did not check range of motion or strength on the lower extremities given his recent spine surgery    Pulses:   Pulses palpable and equal bilaterally.    Skin:   No bleeding or rash.   Neuro:   Non-focal.  Limited exam    Psychiatric:   Normal mood and affect.     Lab Review:   Results from last 7 days   Lab Units  12/17/18   2324   SODIUM mmol/L  134*   POTASSIUM mmol/L  5.9*   CHLORIDE mmol/L  100   CO2 mmol/L  15.2*   BUN mg/dL  30*   CREATININE mg/dL  1.72*   GLUCOSE mg/dL  352*   CALCIUM mg/dL  8.4*         Results from last 7 days   Lab Units  12/17/18   2209   WBC 10*3/mm3  15.66*   HEMOGLOBIN g/dL  12.2*   HEMATOCRIT %  40.1*   PLATELETS 10*3/mm3  264   MCV fL  92.2   MCH pg  28.0   MCHC g/dL  30.4*   RDW %  13.9   RDW-SD fl  47.3   MPV fL  10.1                   Invalid input(s): LDLCALC                                              Imaging:  Imaging Results (most recent)     Procedure Component Value Units Date/Time    FL C Arm During Surgery [[id number]] Resulted:  12/17/18 2106     Updated:  12/17/18 2106    Narrative:       This procedure was auto-finalized with no dictation required.    XR Spine Lumbar 4+ View [[id number]] Updated:  12/17/18 2106          I personally viewed and interpreted the patient's imaging studies.    Assessment:     1. Lumbar stenosis with neurogenic claudication status post right L4-5 and L5-S1 lumbar discectomy with right L4-3 lumbar  decompression with interbody and posterior lateral fusion and interbody and posterior instrumentation  2. Hyperkalemia  3. Acute kidney injury on chronic kidney disease  4. Diabetes mellitus with hyperglycemia  5. Acid reflux  6. Systemic hypertension      Plan:     Patient went to monitor in the ICU, will follow hypoglycemia protocol will follow with a sliding scale, patient will be started on his oral diabetes regimen and is already started on oral diet per neurosurgery.  Avoid any potassium containing medication, and hold on the benazepril until his potassium level is back within normal range and creatinine is back to baseline, and of course until his BP is high without the need of the pressors  We'll monitor the blood pressure and correct any hypo-or hyper tension, he is already on phenylephrine in order to maintain adequate blood pressure  SCDs for DVT prophylaxis, no need for stress ulcer prophylaxis        Thank you for allowing me to participate in the care of Claudio Haney. Feel free to contact me directly with any further questions or concerns.    Rajan Aaron MD  [facility]   12/18/18  4:09 AM    Dictated utilizing Dragon dictation

## 2018-12-18 NOTE — NURSING NOTE
Rachelle Paz NP, notified of ICU admission and K of 6.3.  Orders to recollect specimen and agree to ICU observation

## 2018-12-18 NOTE — THERAPY EVALUATION
Acute Care - Physical Therapy Initial Evaluation  [facility]     Patient Name: Claudio Haney  : 1940  MRN: [id number]  Today's Date: 2018   Onset of Illness/Injury or Date of Surgery: 18     Referring Physician: Lázaro      Admit Date: 2018    Visit Dx:     ICD-10-CM ICD-9-CM   1. Lumbar stenosis with neurogenic claudication M48.062 724.03     Patient Active Problem List   Diagnosis   • Lumbar stenosis with neurogenic claudication   • Left  L4/5 and L5/Q9mtbki disc herniation   • Atrophy of right quadriceps femoris muscle   • Diabetes mellitus (CMS/HCC)   • Hypertension   • Hyperlipidemia   • Acquired spondylolisthesis L3/4 Grade I     Past Medical History:   Diagnosis Date   • Arthritis    • Diabetes mellitus (CMS/HCC)    • GERD (gastroesophageal reflux disease)    • Hyperlipidemia    • Hypertension      Past Surgical History:   Procedure Laterality Date   • COLONOSCOPY     • KNEE LIGAMENT RECONSTRUCTION Right    • PATELLA SURGERY Left    • PENILE PROSTHESIS IMPLANT     • TONSILLECTOMY          PT ASSESSMENT (last 12 hours)      Physical Therapy Evaluation     Row Name 18 4517          PT Evaluation Time/Intention    Subjective Information  no complaints  -AR     Document Type  evaluation  -AR     Mode of Treatment  physical therapy  -AR     Patient Effort  good  -AR     Symptoms Noted During/After Treatment  none  -AR     Row Name 18 2337          General Information    Patient Profile Reviewed?  yes  -AR     Onset of Illness/Injury or Date of Surgery  18  -AR     Referring Physician  Lázaro  -AR     Prior Level of Function  independent:  -AR     Equipment Currently Used at Home  none  -AR     Pertinent History of Current Functional Problem  s/p back sx, HoTN post op  -AR     Existing Precautions/Restrictions  fall;spinal;brace worn when out of bed  -AR     Barriers to Rehab  none identified  -AR     Row Name 18 7230          Resource/Environmental Concerns     Current Living Arrangements  home/apartment/condo  -AR     Row Name 12/18/18 1642          Cognitive Assessment/Intervention- PT/OT    Orientation Status (Cognition)  oriented x 4  -AR     Follows Commands (Cognition)  WNL  -AR     Row Name 12/18/18 1642          Bed Mobility Assessment/Treatment    Bed Mobility Assessment/Treatment  supine-sit;sit-supine  -AR     Supine-Sit [place] (Bed Mobility)  minimum assist (75% patient effort);verbal cues  -AR     Sit-Supine [place] (Bed Mobility)  minimum assist (75% patient effort);verbal cues  -AR     Comment (Bed Mobility)  cues for log rolling  -AR     Row Name 12/18/18 1642          Transfer Assessment/Treatment    Transfer Assessment/Treatment  sit-stand transfer;stand-sit transfer  -AR     Sit-Stand [place] (Transfers)  minimum assist (75% patient effort)  -AR     Stand-Sit [place] (Transfers)  minimum assist (75% patient effort)  -AR     Row Name 12/18/18 1642          Sit-Stand Transfer    Assistive Device (Sit-Stand Transfers)  -- HHA  -AR     Row Name 12/18/18 1642          Gait/Stairs Assessment/Training    Gait/Stairs Assessment/Training  gait/ambulation independence  -AR     [place] Level (Gait)  minimum assist (75% patient effort)  -AR     Assistive Device (Gait)  -- HHA  -AR     Distance in Feet (Gait)  200 50, sitting rest break to check BP then 150'  -AR     Pattern (Gait)  swing-through  -AR     Deviations/Abnormal Patterns (Gait)  gait speed decreased;festinating/shuffling  -AR     Comment (Gait/Stairs)  unsteady but no sig. LOB  -AR     Row Name 12/18/18 1642          General ROM    GENERAL ROM COMMENTS  B LE WFL  -AR     Row Name 12/18/18 1642          MMT (Manual Muscle Testing)    General MMT Comments  B LE 4/5   -AR     Row Name 12/18/18 1642          Pain Assessment    Additional Documentation  Pain Scale: Numbers Pre/Post-Treatment (Group)  -AR     Row Name 12/18/18 1642          Pain Scale: Numbers Pre/Post-Treatment     Pain Scale: Numbers, Pretreatment  0/10 - no pain  -AR     Pain Scale: Numbers, Post-Treatment  0/10 - no pain  -AR     Pain Intervention(s)  Repositioned  -AR     Row Name 12/18/18 1642          Physical Therapy Clinical Impression    Patient/Family Goals Statement (PT Clinical Impression)  DC home  -AR     Criteria for Skilled Interventions Met (PT Clinical Impression)  yes  -AR     Pathology/Pathophysiology Noted (Describe Specifically for Each System)  musculoskeletal  -AR     Impairments Found (describe specific impairments)  aerobic capacity/endurance;gait, locomotion, and balance  -AR     Row Name 12/18/18 1642          Vital Signs    O2 Delivery Pre Treatment  room air  -AR     Row Name 12/18/18 1642          Physical Therapy Goals    Bed Mobility Goal Selection (PT)  bed mobility, PT goal 1  -AR     Transfer Goal Selection (PT)  transfer, PT goal 1  -AR     Gait Training Goal Selection (PT)  gait training, PT goal 1  -AR     Row Name 12/18/18 1642          Bed Mobility Goal 1 (PT)    Activity/Assistive Device (Bed Mobility Goal 1, PT)  sit to supine/supine to sit  -AR     [place] Level/Cues Needed (Bed Mobility Goal 1, PT)  supervision required  -AR     Time Frame (Bed Mobility Goal 1, PT)  1 week  -AR     Row Name 12/18/18 1642          Transfer Goal 1 (PT)    Activity/Assistive Device (Transfer Goal 1, PT)  sit-to-stand/stand-to-sit  -AR     [place] Level/Cues Needed (Transfer Goal 1, PT)  standby assist  -AR     Time Frame (Transfer Goal 1, PT)  1 week  -AR     Row Name 12/18/18 1642          Gait Training Goal 1 (PT)    Activity/Assistive Device (Gait Training Goal 1, PT)  gait (walking locomotion)  -AR     [place] Level (Gait Training Goal 1, PT)  standby assist  -AR     Distance (Gait Goal 1, PT)  200  -AR     Time Frame (Gait Training Goal 1, PT)  1 week  -AR     Row Name 12/18/18 1642          Positioning and Restraints    Pre-Treatment Position  in bed  -AR     Post Treatment  Position  bed  -AR     In Bed  supine;call light within reach;encouraged to call for assist;exit alarm on  -AR     Row Name 12/18/18 1642          Living Environment    Home Accessibility  -- 8 steps to enter  -AR       User Key  (r) = Recorded By, (t) = Taken By, (c) = Cosigned By    Initials Name Provider Type    AR An Joy PT Physical Therapist        Physical Therapy Education     Title: PT OT SLP Therapies (Done)     Topic: Physical Therapy (Done)     Point: Mobility training (Done)     Learning Progress Summary           Patient Acceptance, E, VU by AR at 12/18/2018  5:02 PM                   Point: Home exercise program (Done)     Learning Progress Summary           Patient Acceptance, E, VU by AR at 12/18/2018  5:02 PM                   Point: Body mechanics (Done)     Learning Progress Summary           Patient Acceptance, E, VU by AR at 12/18/2018  5:02 PM                   Point: Precautions (Done)     Learning Progress Summary           Patient Acceptance, E, VU by AR at 12/18/2018  5:02 PM                               User Key     Initials Effective Dates Name Provider Type Discipline    AR 04/03/18 -  An Joy, PT Physical Therapist PT              PT Recommendation and Plan  Anticipated Discharge Disposition (PT): home with assist, home with home health  Planned Therapy Interventions (PT Eval): balance training, bed mobility training, gait training, home exercise program, patient/family education, ROM (range of motion), stair training, strengthening, transfer training  Therapy Frequency (PT Clinical Impression): daily  Outcome Summary/Treatment Plan (PT)  Anticipated Discharge Disposition (PT): home with assist, home with home health  Plan of Care Reviewed With: patient  Outcome Summary: Pt admitted 12/17 after back surgery.  Pt reports independence prior to admission, no AD or falls.  Today pt demonstrates impaired gait patern, standing balance and activity tolerance but able to  ambulate 200' w/ min A and HHA, some unsteadiness but no overt loss of balance.  Educated on activity recommendations, spinal precautions, and PT POC.  Recommend DC to home w/ assist as needed.       Time Calculation:   PT Charges     Row Name 12/18/18 1703             Time Calculation    Start Time  1310  -AR      Stop Time  1330  -AR      Time Calculation (min)  20 min  -AR      PT Received On  12/18/18  -AR      PT - Next Appointment  12/19/18  -AR      PT Goal Re-Cert Due Date  12/25/18  -AR        User Key  (r) = Recorded By, (t) = Taken By, (c) = Cosigned By    Initials Name Provider Type    AR An Joy, PT Physical Therapist        Therapy Suggested Charges     Code   Minutes Charges    None           Therapy Charges for Today     Code Description Service Date Service Provider Modifiers Qty    74226825801 HC PT EVAL MOD COMPLEXITY 2 12/18/2018 An Joy, PT GP 1    28753322805 HC PT THER PROC EA 15 MIN 12/18/2018 An Joy, PT GP 1    23432230888 HC PT THER SUPP EA 15 MIN 12/18/2018 An Joy, PT GP 1                 An Joy PT  12/18/2018

## 2018-12-18 NOTE — NURSING NOTE
Dr. tomas at bedside to assess hypotension.  neosynephrine mixed stat as ordered.  Albumin adm. Stat as ordered.  Two new peripheral IV's placed for more access.  Responding to fluid resuscitation. Dr. tomas remains at bedside to monitor patient progress.

## 2018-12-18 NOTE — PROGRESS NOTES
Discharge Planning Assessment  [facility]     Patient Name: Claudio Haney  MRN: [id number]  Today's Date: 12/18/2018    Admit Date: 12/17/2018    Discharge Needs Assessment     Row Name 12/18/18 0159       Living Environment    Lives With  alone    Current Living Arrangements  home/apartment/condo    Primary Care Provided by  self    Provides Primary Care For  no one    Family Caregiver if Needed  child(felicita), adult    Family Caregiver Names  Claudio Haney son [phone]    Quality of Family Relationships  supportive    Able to Return to Prior Arrangements  yes       Resource/Environmental Concerns    Resource/Environmental Concerns  none    Transportation Concerns  car, none       Transition Planning    Patient/Family Anticipates Transition to  home with family    Patient/Family Anticipated Services at Transition  none    Transportation Anticipated  family or friend will provide       Discharge Needs Assessment    Readmission Within the Last 30 Days  no previous admission in last 30 days    Concerns to be Addressed  no discharge needs identified    Equipment Currently Used at Home  none    Anticipated Changes Related to Illness  none    Equipment Needed After Discharge  none    Offered/Gave Vendor List  no    Current Discharge Risk  lives alone        Discharge Plan     Row Name 12/18/18 5922       Plan    Plan  Home with family support    Patient/Family in Agreement with Plan  yes    Plan Comments  IMM 12/17/2018. Met with patient and friend at bedside, patient granted me permission to speak with him while friend remained in the room.  Prior to admission patient was independent with all aspects of his ADL's.  Patient lives alone and does not use any special or adaptive equipment. Patient uses the [facility] on [address] denies issues obtaining or affording his medications. Patient his POA is Claudio Haney son [phone].  Discharge plans discussed, plan is to return home with family support.  Family will  transport. Will continue to monitor for new or changing discharge care needs.        Destination      No service coordination in this encounter.      Durable Medical Equipment      No service coordination in this encounter.      Dialysis/Infusion      No service coordination in this encounter.      Home Medical Care      No service coordination in this encounter.      Community Resources      No service coordination in this encounter.          Demographic Summary     Row Name 12/18/18 1358       General Information    Admission Type  inpatient    Arrived From  home    Required Notices Provided  Important Message from Medicare    Referral Source  admission list    Reason for Consult  discharge planning    Preferred Language  English     Used During This Interaction  no       Contact Information    Permission Granted to Share Info With  family/designee Claudio ley [phone]        Functional Status     Row Name 12/18/18 1350       Functional Status    Usual Activity Tolerance  good    Current Activity Tolerance  moderate       Functional Status, IADL    Medications  independent    Meal Preparation  independent       Mental Status    General Appearance WDL  WDL       Mental Status Summary    Recent Changes in Mental Status/Cognitive Functioning  no changes       Employment/    Employment Status  retired        Psychosocial    No documentation.       Abuse/Neglect    No documentation.       Legal    No documentation.       Substance Abuse    No documentation.       Patient Forms    No documentation.           Mona Rubin RN

## 2018-12-18 NOTE — PROGRESS NOTES
Had received a consultation for management of diabetes from Dr. Flores  Postoperatively patient was sent to intensive care unit and is on pressors and the ICU team is following the patient  Will follow peripherally and discussed with the ICU team.  Most likely patient will be discharged once stable and weaned off pressors.    Fran Tripathi MD   12/18/2018

## 2018-12-18 NOTE — PROGRESS NOTES
[facility] PROGRESS NOTE    PATIENT IDENTIFICATION:   Name:  Claudio Haney      MRN:  [id number]     78 y.o.  male               CC: POD #1 s/p R L 4-5 and L5-S1 decomp with L3-4 PLIF      Subjective     Interval History: has no complaint of ongoing R leg pain, completely resolved post op. Has expected low back discomfort, denies N/T and weakness      Objective     Vital signs in last 24 hours:  Temp:  [97.6 °F (36.4 °C)-98.3 °F (36.8 °C)] 98.2 °F (36.8 °C)  Heart Rate:  [59-84] 59  Resp:  [14-18] 18  BP: ()/(36-69) 117/67  ICP ranges-    Intake/Output last 3 shifts:  I/O last 3 completed shifts:  In: 2691 [I.V.:2591; IV Piggyback:100]  Out: 1700 [Urine:1700]    Intake/Output this shift:  I/O this shift:  In: -   Out: 150 [Urine:150]      Physical Exam:  General:   Awake, alert, and oriented x 3. NAD  Appears well  Sitting up in bed  R lumbar incision sites CDI, flat, normal surrounding skin  Strength equal BLE  Sensory intact to soft touch  Normal proprioception  Extremities:  Patient is wearing SCDs bilaterally    LABS:    Lab Results (last 24 hours)     Procedure Component Value Units Date/Time    POC Glucose Once [[id number]]  (Abnormal) Collected:  12/17/18 1136    Specimen:  Blood Updated:  12/17/18 1138     Glucose 153 mg/dL     POC Glucose Once [[id number]]  (Abnormal) Collected:  12/17/18 2115    Specimen:  Blood Updated:  12/17/18 2115     Glucose 262 mg/dL     CBC (No Diff) [[id number]]  (Abnormal) Collected:  12/17/18 2209    Specimen:  Blood Updated:  12/17/18 2234     WBC 15.66 10*3/mm3      RBC 4.35 10*6/mm3      Hemoglobin 12.2 g/dL      Hematocrit 40.1 %      MCV 92.2 fL      MCH 28.0 pg      MCHC 30.4 g/dL      RDW 13.9 %      RDW-SD 47.3 fl      MPV 10.1 fL      Platelets 264 10*3/mm3     Basic Metabolic Panel [[id number]]  (Abnormal) Collected:  12/17/18 2324    Specimen:  Blood Updated:  12/18/18 0019     Glucose 352 mg/dL      BUN 30 mg/dL      Creatinine 1.72 mg/dL       Sodium 134 mmol/L      Potassium 5.9 mmol/L      Chloride 100 mmol/L      CO2 15.2 mmol/L      Calcium 8.4 mg/dL      eGFR Non African Amer 39 mL/min/1.73      BUN/Creatinine Ratio 17.4     Anion Gap 18.8 mmol/L     Narrative:       The MDRD GFR formula is only valid for adults with stable renal function between ages 18 and 70.    POC Glucose Once [[id number]]  (Abnormal) Collected:  12/18/18 0016    Specimen:  Blood Updated:  12/18/18 0028     Glucose 289 mg/dL     CBC & Differential [[id number]] Collected:  12/18/18 0631    Specimen:  Blood Updated:  12/18/18 0709    Narrative:       The following orders were created for panel order CBC & Differential.  Procedure                               Abnormality         Status                     ---------                               -----------         ------                     CBC Auto Differential[[id number]]        Abnormal            Final result                 Please view results for these tests on the individual orders.    CBC Auto Differential [[id number]]  (Abnormal) Collected:  12/18/18 0631    Specimen:  Blood Updated:  12/18/18 0709     WBC 14.30 10*3/mm3      RBC 4.14 10*6/mm3      Hemoglobin 11.6 g/dL      Hematocrit 37.5 %      MCV 90.6 fL      MCH 28.0 pg      MCHC 30.9 g/dL      RDW 14.1 %      RDW-SD 46.5 fl      MPV 10.2 fL      Platelets 169 10*3/mm3      Neutrophil % 86.4 %      Lymphocyte % 5.1 %      Monocyte % 8.0 %      Eosinophil % 0.1 %      Basophil % 0.1 %      Immature Grans % 0.3 %      Neutrophils, Absolute 12.37 10*3/mm3      Lymphocytes, Absolute 0.73 10*3/mm3      Monocytes, Absolute 1.14 10*3/mm3      Eosinophils, Absolute 0.01 10*3/mm3      Basophils, Absolute 0.01 10*3/mm3      Immature Grans, Absolute 0.04 10*3/mm3     POC Glucose Once [[id number]]  (Abnormal) Collected:  12/18/18 0753    Specimen:  Blood Updated:  12/18/18 0754     Glucose 332 mg/dL     Basic Metabolic Panel [[id number]]  (Abnormal) Collected:  12/18/18 0803     Specimen:  Blood Updated:  12/18/18 0842     Glucose 338 mg/dL      BUN 29 mg/dL      Creatinine 1.62 mg/dL      Sodium 135 mmol/L      Potassium 5.2 mmol/L      Chloride 99 mmol/L      CO2 20.6 mmol/L      Calcium 8.6 mg/dL      eGFR Non African Amer 42 mL/min/1.73      BUN/Creatinine Ratio 17.9     Anion Gap 15.4 mmol/L     Narrative:       The MDRD GFR formula is only valid for adults with stable renal function between ages 18 and 70.          IMAGING STUDIES:  No new imaging      Meds reviewed/changed: Yes    Current Facility-Administered Medications   Medication Dose Route Frequency Provider Last Rate Last Dose   • bisacodyl (DULCOLAX) EC tablet 10 mg  10 mg Oral Daily PRN Denis Flores MD       • ceFAZolin in dextrose (ANCEF) IVPB solution 2 g  2 g Intravenous Q8H Denis Flores MD   2 g at 12/18/18 0246   • dextrose (D50W) 25 g/ 50mL Intravenous Solution 25 g  25 g Intravenous Q15 Min PRN Rajan Aaron MD       • dextrose (GLUTOSE) oral gel 15 g  15 g Oral Q15 Min PRN Rajan Aaron MD       • docusate sodium (COLACE) capsule 100 mg  100 mg Oral BID PRN Denis Flores MD       • glipiZIDE (GLUCOTROL) tablet 10 mg  10 mg Oral QAM AC Denis Flores MD   10 mg at 12/18/18 0758   • glucagon (human recombinant) (GLUCAGEN DIAGNOSTIC) injection 1 mg  1 mg Subcutaneous PRN Rajan Aaron MD       • HYDROcodone-acetaminophen (NORCO)  MG per tablet 1 tablet  1 tablet Oral Q4H PRN Denis Flores MD       • HYDROcodone-acetaminophen (NORCO) 5-325 MG per tablet 1 tablet  1 tablet Oral Q4H PRN Denis Flores MD       • HYDROmorphone (DILAUDID) injection 0.5 mg  0.5 mg Intravenous Q2H PRN Denis Flores MD   0.5 mg at 12/18/18 0245    And   • naloxone (NARCAN) injection 0.1 mg  0.1 mg Intravenous Q5 Min PRN Denis Flores MD       • insulin lispro (humaLOG) injection 0-7 Units  0-7 Units Subcutaneous 4x Daily With Meals & Nightly Rajan Aaron MD   5 Units at 12/18/18 0758   •  lactated ringers infusion  100 mL/hr Intravenous Continuous Denis Flores  mL/hr at 12/17/18 1229     • lactated ringers infusion  9 mL/hr Intravenous Continuous Bertram Leon MD 9 mL/hr at 12/18/18 0037 9 mL/hr at 12/18/18 0037   • lactated ringers infusion  75 mL/hr Intravenous Continuous Denis Flores MD       • metFORMIN (GLUCOPHAGE) tablet 1,000 mg  1,000 mg Oral Daily With Breakfast Denis Flores MD   1,000 mg at 12/18/18 0758   • ondansetron (ZOFRAN) tablet 4 mg  4 mg Oral Q6H PRN Denis Flores MD        Or   • ondansetron ODT (ZOFRAN-ODT) disintegrating tablet 4 mg  4 mg Oral Q6H PRN Denis Flores MD        Or   • ondansetron (ZOFRAN) injection 4 mg  4 mg Intravenous Q6H PRN Denis Flores MD       • phenylephrine (ADAN-SYNEPHRINE) 50 mg in sodium chloride 0.9 % 250 mL (0.2 mg/mL) infusion  0.5-3 mcg/kg/min Intravenous Titrated Marta Cunningham MD 19.87 mL/hr at 12/17/18 2310 0.8 mcg/kg/min at 12/17/18 2310       Assessment/Plan     ASSESSMENT:      Lumbar stenosis with neurogenic claudication      PLAN: He is doing very well postop day 1 and reports complete resolution of preoperative right leg pain.  Strength is intact on exam and the right lower extremity.  Pending PT evaluation.  He remains on low-dose pressors secondary to PO hypotension.  He is to wear the LSO brace at all times when out of bed.  Hopeful DC home soon.  Postoperative discomfort is well-controlled oral and IV narcotics.    I discussed the patients findings and my recommendations with patient, nursing staff and Dr Flores       LOS: 1 day       Airam Maria, NILSON  12/18/2018  10:18 AM

## 2018-12-18 NOTE — PLAN OF CARE
Problem: Patient Care Overview  Goal: Plan of Care Review  Outcome: Ongoing (interventions implemented as appropriate)   12/18/18 0556   Coping/Psychosocial   Plan of Care Reviewed With patient   Plan of Care Review   Progress improving   OTHER   Outcome Summary Pt arrived to ICU overnight, alert and oriented with stable pressures on 0.8mcg Neosinephrine. Given 1 dose 0.5mg Dilaudid with good results, minimal complaints of pain. Tolerated fluids and food well, no nausea. All pulses equal and strong, pt reports improvement in numbness/tingling. Will continue to monitor.       Problem: Fall Risk (Adult)  Goal: Identify Related Risk Factors and Signs and Symptoms  Outcome: Outcome(s) achieved Date Met: 12/18/18    Goal: Absence of Fall  Outcome: Ongoing (interventions implemented as appropriate)   12/18/18 0556   Fall Risk (Adult)   Absence of Fall achieves outcome       Problem: Skin Injury Risk (Adult)  Goal: Identify Related Risk Factors and Signs and Symptoms  Outcome: Ongoing (interventions implemented as appropriate)   12/18/18 0556   Skin Injury Risk (Adult)   Related Risk Factors (Skin Injury Risk) critical care admission     Goal: Skin Health and Integrity  Outcome: Ongoing (interventions implemented as appropriate)   12/18/18 0556   Skin Injury Risk (Adult)   Skin Health and Integrity achieves outcome       Problem: Pain, Acute (Adult)  Goal: Identify Related Risk Factors and Signs and Symptoms  Outcome: Ongoing (interventions implemented as appropriate)   12/18/18 0556   Pain, Acute (Adult)   Related Risk Factors (Acute Pain) surgery   Signs and Symptoms (Acute Pain) verbalization of pain descriptors     Goal: Acceptable Pain Control/Comfort Level  Outcome: Ongoing (interventions implemented as appropriate)   12/18/18 0556   Pain, Acute (Adult)   Acceptable Pain Control/Comfort Level making progress toward outcome       Problem: Surgery Nonspecified (Adult)  Goal: Signs and Symptoms of Listed Potential  Problems Will be Absent, Minimized or Managed (Surgery Nonspecified)  Outcome: Ongoing (interventions implemented as appropriate)   12/18/18 0556   Goal/Outcome Evaluation   Problems Assessed (Surgery) pain   Problems Present (Surgery) pain     Goal: Anesthesia/Sedation Recovery  Outcome: Ongoing (interventions implemented as appropriate)   12/18/18 0556   Goal/Outcome Evaluation   Anesthesia/Sedation Recovery progressing toward baseline

## 2018-12-18 NOTE — PLAN OF CARE
Problem: Patient Care Overview  Goal: Plan of Care Review   12/18/18 2522   Coping/Psychosocial   Plan of Care Reviewed With patient   OTHER   Outcome Summary Pt admitted 12/17 after back surgery. Pt reports independence prior to admission, no AD or falls. Today pt demonstrates impaired gait patern, standing balance and activity tolerance but able to ambulate 200' w/ min A and HHA, some unsteadiness but no overt loss of balance. Educated on activity recommendations, spinal precautions, and PT POC. Recommend DC to home w/ assist as needed.

## 2018-12-18 NOTE — BRIEF OP NOTE
LUMBAR FUSION SEXTANT AND METRIX WITH STEALTH  Progress Note    Claudio Haney  12/17/2018    Pre-op Diagnosis:   Lumbar stenosis with neurogenic claudication [M48.062]       Post-Op Diagnosis Codes:     * Lumbar stenosis with neurogenic claudication [M48.062]    Procedure/CPT® Codes:      Procedure(s):  Right L4 5 and L5-S1 lumbar discectomy, Right L3 4 lumbar decompression with interbody and posterior lateral fusion and interbody and posterior instrumentation    Surgeon(s):  Denis Flores MD    Anesthesia: General    Staff:   Circulator: Nury Amor RN; Chantel Tuttle RN  Radiology Technologist: Adrien Frederick, RRT  Scrub Person: Helen Fan; Maria Eugenia Phillips; Segundo Doherty  Vendor Representative: Paddy Smith  Assistant: Maria Eugenia Gautam CSA    Estimated Blood Loss: 100ml    Urine Voided: 150 mL    Specimens:                None      Drains:   Urethral Catheter Silicone 16 Fr. (Active)       Findings: Severe LSS    Complications: none      Denis Flores MD     Date: 12/17/2018  Time: 8:05 PM

## 2018-12-18 NOTE — ANESTHESIA POSTPROCEDURE EVALUATION
Patient: Claudio Haney    Procedure Summary     Date:  12/17/18 Room / Location:  [facility] OR [address] [place] [address] MAIN OR    Anesthesia Start:  1535 Anesthesia Stop:  2109    Procedure:  Right L4 5 and L5-S1 lumbar discectomy, Right L3 4 lumbar decompression with interbody and posterior lateral fusion and interbody and posterior instrumentation (Right Spine Lumbar) Diagnosis:       Lumbar stenosis with neurogenic claudication      (Lumbar stenosis with neurogenic claudication [M48.062])    Surgeon:  Denis Flores MD Provider:  Marta Cunningham MD    Anesthesia Type:  general ASA Status:  2          Anesthesia Type: general  Last vitals  BP   111/69 (12/17/18 2250)   Temp   36.4 °C (97.6 °F) (12/17/18 1150)   Pulse   75 (12/17/18 2250)   Resp   16 (12/17/18 2250)     SpO2   98 % (12/17/18 2250)     Post Anesthesia Care and Evaluation    Patient location during evaluation: PACU  Patient participation: complete - patient participated  Level of consciousness: awake and alert  Pain management: adequate  Airway patency: patent  Anesthetic complications: No anesthetic complications  PONV Status: none  Cardiovascular status: acceptable and hemodynamically stable  Respiratory status: acceptable  Hydration status: acceptable

## 2018-12-18 NOTE — PROGRESS NOTES
Chart reviewed seen today By Dr Aaron.  Wean  as able  IVFS for TANYA with serial labs  Trend K, improving  Adjust glycemic control.    Plan of care and patient course was reviewed with multidisciplinary team in morning rounds.      Russ Martines MD  [facility]  12/18/18  12:01 PM

## 2018-12-19 LAB
ANION GAP SERPL CALCULATED.3IONS-SCNC: 9.3 MMOL/L
BUN BLD-MCNC: 18 MG/DL (ref 8–23)
BUN/CREAT SERPL: 15.7 (ref 7–25)
CALCIUM SPEC-SCNC: 8.3 MG/DL (ref 8.6–10.5)
CHLORIDE SERPL-SCNC: 102 MMOL/L (ref 98–107)
CO2 SERPL-SCNC: 21.7 MMOL/L (ref 22–29)
CREAT BLD-MCNC: 1.15 MG/DL (ref 0.76–1.27)
DEPRECATED RDW RBC AUTO: 47.9 FL (ref 37–54)
ERYTHROCYTE [DISTWIDTH] IN BLOOD BY AUTOMATED COUNT: 14.3 % (ref 11.5–14.5)
GFR SERPL CREATININE-BSD FRML MDRD: 62 ML/MIN/1.73
GLUCOSE BLD-MCNC: 169 MG/DL (ref 65–99)
GLUCOSE BLDC GLUCOMTR-MCNC: 130 MG/DL (ref 70–130)
GLUCOSE BLDC GLUCOMTR-MCNC: 145 MG/DL (ref 70–130)
GLUCOSE BLDC GLUCOMTR-MCNC: 151 MG/DL (ref 70–130)
GLUCOSE BLDC GLUCOMTR-MCNC: 181 MG/DL (ref 70–130)
GLUCOSE BLDC GLUCOMTR-MCNC: 268 MG/DL (ref 70–130)
HCT VFR BLD AUTO: 37.2 % (ref 40.4–52.2)
HGB BLD-MCNC: 11.4 G/DL (ref 13.7–17.6)
MCH RBC QN AUTO: 27.9 PG (ref 27–32.7)
MCHC RBC AUTO-ENTMCNC: 30.6 G/DL (ref 32.6–36.4)
MCV RBC AUTO: 91.2 FL (ref 79.8–96.2)
PLATELET # BLD AUTO: 193 10*3/MM3 (ref 140–500)
PMV BLD AUTO: 9.9 FL (ref 6–12)
POTASSIUM BLD-SCNC: 5 MMOL/L (ref 3.5–5.2)
RBC # BLD AUTO: 4.08 10*6/MM3 (ref 4.6–6)
SODIUM BLD-SCNC: 133 MMOL/L (ref 136–145)
WBC NRBC COR # BLD: 12.48 10*3/MM3 (ref 4.5–10.7)

## 2018-12-19 PROCEDURE — 63710000001 INSULIN LISPRO (HUMAN) PER 5 UNITS: Performed by: INTERNAL MEDICINE

## 2018-12-19 PROCEDURE — 85027 COMPLETE CBC AUTOMATED: CPT | Performed by: INTERNAL MEDICINE

## 2018-12-19 PROCEDURE — 25010000002 METHOCARBAMOL 1000 MG/10ML SOLUTION: Performed by: NURSE PRACTITIONER

## 2018-12-19 PROCEDURE — 80048 BASIC METABOLIC PNL TOTAL CA: CPT | Performed by: INTERNAL MEDICINE

## 2018-12-19 PROCEDURE — 82962 GLUCOSE BLOOD TEST: CPT

## 2018-12-19 PROCEDURE — 99024 POSTOP FOLLOW-UP VISIT: CPT | Performed by: NURSE PRACTITIONER

## 2018-12-19 PROCEDURE — 63710000001 INSULIN GLARGINE PER 5 UNITS: Performed by: INTERNAL MEDICINE

## 2018-12-19 PROCEDURE — 97110 THERAPEUTIC EXERCISES: CPT

## 2018-12-19 RX ORDER — METHOCARBAMOL 750 MG/1
750 TABLET, FILM COATED ORAL 4 TIMES DAILY
Status: DISCONTINUED | OUTPATIENT
Start: 2018-12-19 | End: 2018-12-20 | Stop reason: HOSPADM

## 2018-12-19 RX ORDER — METHOCARBAMOL 100 MG/ML
1000 INJECTION, SOLUTION INTRAMUSCULAR; INTRAVENOUS ONCE
Status: COMPLETED | OUTPATIENT
Start: 2018-12-19 | End: 2018-12-19

## 2018-12-19 RX ADMIN — ACETAMINOPHEN 650 MG: 325 TABLET, FILM COATED ORAL at 17:17

## 2018-12-19 RX ADMIN — HYDROCODONE BITARTRATE AND ACETAMINOPHEN 1 TABLET: 10; 325 TABLET ORAL at 06:16

## 2018-12-19 RX ADMIN — INSULIN LISPRO 4 UNITS: 100 INJECTION, SOLUTION INTRAVENOUS; SUBCUTANEOUS at 01:00

## 2018-12-19 RX ADMIN — METHOCARBAMOL 1000 MG: 100 INJECTION, SOLUTION INTRAMUSCULAR; INTRAVENOUS at 13:16

## 2018-12-19 RX ADMIN — ACETAMINOPHEN 650 MG: 325 TABLET, FILM COATED ORAL at 11:22

## 2018-12-19 RX ADMIN — GLIPIZIDE 10 MG: 10 TABLET ORAL at 08:37

## 2018-12-19 RX ADMIN — INSULIN LISPRO 2 UNITS: 100 INJECTION, SOLUTION INTRAVENOUS; SUBCUTANEOUS at 15:40

## 2018-12-19 RX ADMIN — INSULIN GLARGINE 12 UNITS: 100 INJECTION, SOLUTION SUBCUTANEOUS at 22:33

## 2018-12-19 RX ADMIN — INSULIN LISPRO 12 UNITS: 100 INJECTION, SOLUTION INTRAVENOUS; SUBCUTANEOUS at 11:47

## 2018-12-19 RX ADMIN — METFORMIN HYDROCHLORIDE 1000 MG: 1000 TABLET ORAL at 13:19

## 2018-12-19 RX ADMIN — HYDROCODONE BITARTATE AND ACETAMINOPHEN 1 TABLET: 5; 325 TABLET ORAL at 22:30

## 2018-12-19 NOTE — PROGRESS NOTES
"  Daily Progress Note.   [facility] INTENSIVE CARE  2018    Patient:  Name:  Claudio Haney  MRN:  [id number]  1940  78 y.o.  male         Interval History:    ROS: No fever, no diarrhea, no chest pain  PMFSSH: no change    Physical Exam:  /73   Pulse 67   Temp 98.6 °F (37 °C) (Oral)   Resp 22   Ht 180.3 cm (71\")   Wt 82.8 kg (182 lb 7 oz)   SpO2 95%   BMI 25.44 kg/m²   Body mass index is 25.44 kg/m².    Intake/Output Summary (Last 24 hours) at 2018 1055  Last data filed at 2018 0600  Gross per 24 hour   Intake 480 ml   Output 900 ml   Net -420 ml     GENERAL:  NAD, Aox3  HEENT:  EOMI, nonicteric sclera, no JVD  PULMONARY:    CTAB, no wheeze, no crackles, no rhonchi, symmetric air entry  CARDIAC:  RRR no MRG, S1 S2  ABD: SNTND BS+  EXT: no c/c/e, pulses symmetric 2+ bilaterally  NEURO:  CNs II-XII intact, no focal deficits  SKIN: no lesions, no rash  PSYCH: appropriate mood    Data Review:  Notable Labs:  Results from last 7 days   Lab Units  18   0724  18   0631  18   2209   WBC 10*3/mm3  12.48*  14.30*  15.66*   HEMOGLOBIN g/dL  11.4*  11.6*  12.2*   PLATELETS 10*3/mm3  193  169  264     Results from last 7 days   Lab Units  18   0724  18   0803  18   2324   SODIUM mmol/L  133*  135*  134*   POTASSIUM mmol/L  5.0  5.2  5.9*   CHLORIDE mmol/L  102  99  100   CO2 mmol/L  21.7*  20.6*  15.2*   BUN mg/dL  18  29*  30*   CREATININE mg/dL  1.15  1.62*  1.72*   GLUCOSE mg/dL  169*  338*  352*   CALCIUM mg/dL  8.3*  8.6  8.4*   Estimated Creatinine Clearance: 64 mL/min (by C-G formula based on SCr of 1.15 mg/dL).    Imagin2018 :  XR Spine Lumbar 4+ View [[id number]] Ethan as Reviewed   Order Status: Completed Collected: 18 0738    Updated: 18   Narrative:     4 VIEW PORTABLE LUMBAR SPINE IN OR     HISTORY: Localization imaging during surgery.     Imaging in the operating room was performed for localization " purposes  and shows anterior and posterior fusion at L3-L4 with posterior plates  and pedicle screws.     12 images were obtained and the fluoroscopy time measures 2 minutes.            Scheduled meds:      glipiZIDE 10 mg Oral QAM AC   insulin glargine 12 Units Subcutaneous Nightly   insulin lispro 0-24 Units Subcutaneous Q6H       ASSESSMENT  /  PLAN:  s/p R L 4-5 and L5-S1 decomp with L3-4 PLIF on 12/17/2018  TANYA - improving nicely  Hyperkalemia - improved  Leukocytosis improving  Hypertension  Hyperlipidemia  Diabetes    Blood pressure not elevated heart rate control no fevers.  He is doing very well and likely can be on room air.  Diabetic management is a susceptible.  Suspect he will be able to be transferred out of the unit today if acceptable with neurosurgery.  Will restart blood pressure medications as necessary as he recovers from his surgery.          Russ Martines MD  [facility]  12/19/18  11:03 AM

## 2018-12-19 NOTE — THERAPY TREATMENT NOTE
Acute Care - Physical Therapy Treatment Note  [facility]     Patient Name: Claudio Haney  : 1940  MRN: [id number]  Today's Date: 2018  Onset of Illness/Injury or Date of Surgery: 18     Referring Physician: Lázaro    Admit Date: 2018    Visit Dx:    ICD-10-CM ICD-9-CM   1. Left  L4/5 and L5/O0ugdrx disc herniation M51.26 722.10   2. Lumbar stenosis with neurogenic claudication M48.062 724.03     Patient Active Problem List   Diagnosis   • Lumbar stenosis with neurogenic claudication   • Left  L4/5 and L5/Z1ewkma disc herniation   • Atrophy of right quadriceps femoris muscle   • Diabetes mellitus (CMS/HCC)   • Hypertension   • Hyperlipidemia   • Acquired spondylolisthesis L3/4 Grade I       Therapy Treatment    Rehabilitation Treatment Summary     Row Name 18 1102             Treatment Time/Intention    Discipline  physical therapist  -AR      Document Type  therapy note (daily note)  -AR      Subjective Information  no complaints  -AR      Mode of Treatment  physical therapy  -AR      Therapy Frequency (PT Clinical Impression)  daily  -AR      Patient Effort  good  -AR      Existing Precautions/Restrictions  fall;spinal;brace worn when out of bed  -AR      Recorded by [AR] An Joy, PT 18 1145      Row Name 18 1102             Vital Signs    O2 Delivery Pre Treatment  room air  -AR      Recorded by [AR] An Joy, PT 18 1145      Row Name 18 1102             Cognitive Assessment/Intervention- PT/OT    Orientation Status (Cognition)  oriented x 4  -AR      Follows Commands (Cognition)  WNL  -AR      Recorded by [AR] An Joy, PT 18 1145      Row Name 18 1102             Bed Mobility Assessment/Treatment    Supine-Sit [place] (Bed Mobility)  supervision  -AR      Sit-Supine [place] (Bed Mobility)  not tested  -AR      Comment (Bed Mobility)  cues for log roll  -AR      Recorded by [AR] An Joy, PT 18  1145      Row Name 12/19/18 1102             Transfer Assessment/Treatment    Transfer Assessment/Treatment  stand-sit transfer  -AR      Recorded by [AR] An Joy, PT 12/19/18 1145      Row Name 12/19/18 1102             Sit-Stand Transfer    Sit-Stand [place] (Transfers)  minimum assist (75% patient effort)  -AR      Assistive Device (Sit-Stand Transfers)  walker, front-wheeled  -AR      Recorded by [AR] An Joy, PT 12/19/18 1145      Row Name 12/19/18 1102             Stand-Sit Transfer    Stand-Sit [place] (Transfers)  contact guard  -AR      Assistive Device (Stand-Sit Transfers)  walker, front-wheeled  -AR      Recorded by [AR] An Joy, PT 12/19/18 1145      Row Name 12/19/18 1102             Gait/Stairs Assessment/Training    Gait/Stairs Assessment/Training  gait/ambulation independence  -AR      [place] Level (Gait)  contact guard  -AR      Assistive Device (Gait)  walker, front-wheeled  -AR      Distance in Feet (Gait)  150  -AR      Pattern (Gait)  swing-through  -AR      Deviations/Abnormal Patterns (Gait)  festinating/shuffling;gait speed decreased  -AR      Recorded by [AR] An Joy, PT 12/19/18 1145      Row Name 12/19/18 1102             Pain Assessment    Additional Documentation  Pain Scale: Numbers Pre/Post-Treatment (Group)  -AR      Recorded by [AR] An Joy, PT 12/19/18 1145      Row Name 12/19/18 1102             Pain Scale: Numbers Pre/Post-Treatment    Pain Scale: Numbers, Pretreatment  4/10  -AR      Pain Scale: Numbers, Post-Treatment  4/10  -AR      Pain Location  back  -AR      Pain Intervention(s)  Repositioned;Medication (See MAR)  -AR      Recorded by [AR] An Joy, PT 12/19/18 1145      Row Name 12/19/18 1102             Outcome Summary/Treatment Plan (PT)    Anticipated Discharge Disposition (PT)  home with assist;home with home health  -AR      Recorded by [AR] An Joy, PT 12/19/18 1145        User Key  (r) =  Recorded By, (t) = Taken By, (c) = Cosigned By    Initials Name Effective Dates Discipline    An Stone, SARAVANAN 04/03/18 -  PT                   Physical Therapy Education     Title: PT OT SLP Therapies (Done)     Topic: Physical Therapy (Done)     Point: Mobility training (Done)     Learning Progress Summary           Patient Acceptance, E, VU by AR at 12/19/2018 11:45 AM    Acceptance, E, VU by AR at 12/18/2018  5:02 PM                   Point: Home exercise program (Done)     Learning Progress Summary           Patient Acceptance, E, VU by AR at 12/19/2018 11:45 AM    Acceptance, E, VU by AR at 12/18/2018  5:02 PM                   Point: Body mechanics (Done)     Learning Progress Summary           Patient Acceptance, E, VU by AR at 12/19/2018 11:45 AM    Acceptance, E, VU by AR at 12/18/2018  5:02 PM                   Point: Precautions (Done)     Learning Progress Summary           Patient Acceptance, E, VU by AR at 12/19/2018 11:45 AM    Acceptance, E, VU by AR at 12/18/2018  5:02 PM                               User Key     Initials Effective Dates Name Provider Type Discipline    AR 04/03/18 -  An Joy, PT Physical Therapist PT                PT Recommendation and Plan  Anticipated Discharge Disposition (PT): home with assist, home with home health  Planned Therapy Interventions (PT Eval): balance training, bed mobility training, gait training, home exercise program, patient/family education, ROM (range of motion), stair training, strengthening, transfer training  Therapy Frequency (PT Clinical Impression): daily  Outcome Summary/Treatment Plan (PT)  Anticipated Discharge Disposition (PT): home with assist, home with home health  Plan of Care Reviewed With: patient  Outcome Summary: Improved independence during PT today, using RW during gait.  Ambulated 150' w/ contact assist using RW.  Reviewed spinal precautions and activity recommendations.       Time Calculation:   PT Charges     Row  Name 12/19/18 1146             Time Calculation    Start Time  1102  -AR      Stop Time  1114  -AR      Time Calculation (min)  12 min  -AR      PT Received On  12/19/18  -AR      PT - Next Appointment  12/20/18  -AR        User Key  (r) = Recorded By, (t) = Taken By, (c) = Cosigned By    Initials Name Provider Type    AR An Joy, PT Physical Therapist        Therapy Suggested Charges     Code   Minutes Charges    None           Therapy Charges for Today     Code Description Service Date Service Provider Modifiers Qty    92708169153 HC PT EVAL MOD COMPLEXITY 2 12/18/2018 An Joy, PT GP 1    17043160470 HC PT THER PROC EA 15 MIN 12/18/2018 An Joy, PT GP 1    07720584410 HC PT THER SUPP EA 15 MIN 12/18/2018 An Joy, PT GP 1    72131738105 HC PT THER PROC EA 15 MIN 12/19/2018 An Joy, PT GP 1    31975804446 HC PT THER SUPP EA 15 MIN 12/19/2018 An Joy, PT GP 1               An Joy PT  12/19/2018

## 2018-12-19 NOTE — PLAN OF CARE
Problem: Patient Care Overview  Goal: Plan of Care Review  Outcome: Ongoing (interventions implemented as appropriate)   12/19/18 3097   Coping/Psychosocial   Plan of Care Reviewed With patient;significant other   Plan of Care Review   Progress improving   OTHER   Outcome Summary Ambulated with PT today. Sat in chair for approx 1 hour. Omar well Wearing brace while up. Appetite fair not eating much. Refused muscle relaxer this evening. Took tylenol instead. Transfer to [facility].

## 2018-12-19 NOTE — PLAN OF CARE
Problem: Patient Care Overview  Goal: Plan of Care Review   12/19/18 8034   Coping/Psychosocial   Plan of Care Reviewed With patient   OTHER   Outcome Summary Improved independence during PT today, using RW during gait. Ambulated 150' w/ contact assist using RW. Reviewed spinal precautions and activity recommendations.

## 2018-12-19 NOTE — PROGRESS NOTES
[facility] PROGRESS NOTE    PATIENT IDENTIFICATION:   Name:  Claudio Haney      MRN:  [id number]     78 y.o.  male               CC: POD #2 s/p R L 4-5 and L5-S1 decomp with L3-4 PLIF            Subjective     Interval History: has complaints of worsening pain/muscle tightness in the low back. Legs feel good, walked around the unit yesterday    Objective     Vital signs in last 24 hours:  Temp:  [97.9 °F (36.6 °C)-98.6 °F (37 °C)] 98.6 °F (37 °C)  Heart Rate:  [67-82] 67  Resp:  [22] 22  BP: ()/(43-73) 138/73  ICP ranges-    Intake/Output last 3 shifts:  I/O last 3 completed shifts:  In: 1171 [P.O.:480; I.V.:591; IV Piggyback:100]  Out: 2600 [Urine:2600]    Intake/Output this shift:  No intake/output data recorded.      Physical Exam:  General:   Awake, alert, and oriented x 3. NAD  Appears well  Lumbar incision sites CDI, flat, well approximated, healing well  Moving all extremities well  Strength equal throughout w exception of slight quad weakness on the R  BP stable off pressors  Sensation intact throughout to soft touch        LABS:    Lab Results (last 24 hours)     Procedure Component Value Units Date/Time    POC Glucose Once [[id number]]  (Abnormal) Collected:  12/18/18 1138    Specimen:  Blood Updated:  12/18/18 1141     Glucose 273 mg/dL     POC Glucose Once [[id number]]  (Abnormal) Collected:  12/18/18 1521    Specimen:  Blood Updated:  12/18/18 1524     Glucose 161 mg/dL     POC Glucose Once [[id number]]  (Abnormal) Collected:  12/19/18 0105    Specimen:  Blood Updated:  12/19/18 0108     Glucose 181 mg/dL     POC Glucose Once [[id number]]  (Abnormal) Collected:  12/19/18 0613    Specimen:  Blood Updated:  12/19/18 0615     Glucose 145 mg/dL     Basic Metabolic Panel [[id number]]  (Abnormal) Collected:  12/19/18 0724    Specimen:  Blood Updated:  12/19/18 0811     Glucose 169 mg/dL      BUN 18 mg/dL      Creatinine 1.15 mg/dL      Sodium 133 mmol/L      Potassium 5.0 mmol/L       Chloride 102 mmol/L      CO2 21.7 mmol/L      Calcium 8.3 mg/dL      eGFR Non African Amer 62 mL/min/1.73      BUN/Creatinine Ratio 15.7     Anion Gap 9.3 mmol/L     Narrative:       The MDRD GFR formula is only valid for adults with stable renal function between ages 18 and 70.    CBC (No Diff) [[id number]]  (Abnormal) Collected:  12/19/18 0724    Specimen:  Blood Updated:  12/19/18 0758     WBC 12.48 10*3/mm3      RBC 4.08 10*6/mm3      Hemoglobin 11.4 g/dL      Hematocrit 37.2 %      MCV 91.2 fL      MCH 27.9 pg      MCHC 30.6 g/dL      RDW 14.3 %      RDW-SD 47.9 fl      MPV 9.9 fL      Platelets 193 10*3/mm3           IMAGING STUDIES:    No new imaging    Meds reviewed/changed: Yes    Current Facility-Administered Medications   Medication Dose Route Frequency Provider Last Rate Last Dose   • acetaminophen (TYLENOL) tablet 650 mg  650 mg Oral Q6H PRN Russ Martines MD   650 mg at 12/18/18 1056   • bisacodyl (DULCOLAX) EC tablet 10 mg  10 mg Oral Daily PRN Denis Flores MD       • dextrose (D50W) 25 g/ 50mL Intravenous Solution 25 g  25 g Intravenous Q15 Min PRN Rajan Aaron MD       • dextrose (GLUTOSE) oral gel 15 g  15 g Oral Q15 Min PRN Rajan Aaron MD       • docusate sodium (COLACE) capsule 100 mg  100 mg Oral BID PRN Denis Flores MD       • glipiZIDE (GLUCOTROL) tablet 10 mg  10 mg Oral QAM AC Denis Flores MD   10 mg at 12/19/18 0837   • glucagon (human recombinant) (GLUCAGEN DIAGNOSTIC) injection 1 mg  1 mg Subcutaneous PRN Rajan Aaron MD       • HYDROcodone-acetaminophen (NORCO)  MG per tablet 1 tablet  1 tablet Oral Q4H PRN Denis Flores MD   1 tablet at 12/19/18 0616   • HYDROcodone-acetaminophen (NORCO) 5-325 MG per tablet 1 tablet  1 tablet Oral Q4H PRN Denis Flores MD       • HYDROmorphone (DILAUDID) injection 0.5 mg  0.5 mg Intravenous Q2H PRN Denis Flores MD   0.5 mg at 12/18/18 0775    And   • naloxone (NARCAN) injection 0.1 mg  0.1 mg  Intravenous Q5 Min PRN Denis Flores MD       • insulin glargine (LANTUS) injection 12 Units  12 Units Subcutaneous Nightly Russ Martines MD   12 Units at 12/18/18 2119   • insulin lispro (humaLOG) injection 0-24 Units  0-24 Units Subcutaneous Q6H Russ Martines MD   4 Units at 12/19/18 0100   • lactated ringers infusion  9 mL/hr Intravenous Continuous Bertram Leon MD 9 mL/hr at 12/18/18 0037 9 mL/hr at 12/18/18 0037   • lactated ringers infusion  75 mL/hr Intravenous Continuous Denis Flores MD       • melatonin tablet 5 mg  5 mg Oral Nightly PRN Griselda Hamilton APRN   5 mg at 12/18/18 2337   • ondansetron (ZOFRAN) tablet 4 mg  4 mg Oral Q6H PRN Denis Flores MD        Or   • ondansetron ODT (ZOFRAN-ODT) disintegrating tablet 4 mg  4 mg Oral Q6H PRN Denis Flores MD        Or   • ondansetron (ZOFRAN) injection 4 mg  4 mg Intravenous Q6H PRN Denis Flores MD       • phenylephrine (ADAN-SYNEPHRINE) 50 mg in sodium chloride 0.9 % 250 mL (0.2 mg/mL) infusion  0.5-3 mcg/kg/min Intravenous Titrated Marta Cunningham MD 19.87 mL/hr at 12/17/18 2310 0.8 mcg/kg/min at 12/17/18 2310       Assessment/Plan     ASSESSMENT:      Lumbar stenosis with neurogenic claudication      PLAN: He is doing very well postop day 2 and continues to have complete resolution of preoperative right leg pain.  He is reporting slight worsening of low back pain secondary to muscle tightness.  I will start him on muscle relaxers with an initial IV dose followed by PO.  He is not ready for discharge home today and wants to work more with PT.  He will be transferred to [facility].  White count trending down.  DC home.    I discussed the patients findings and my recommendations with patient, family, nursing staff and Dr Flores       LOS: 2 days       NILSON Pringle  12/19/2018  10:42 AM

## 2018-12-19 NOTE — PLAN OF CARE
Problem: Patient Care Overview  Goal: Plan of Care Review  Outcome: Ongoing (interventions implemented as appropriate)   12/19/18 0616   Coping/Psychosocial   Plan of Care Reviewed With patient   Plan of Care Review   Progress no change   OTHER   Outcome Summary No significant events overnight. Pt continues to complain of HA and minimal back pain, improved with 1 dose Norco and 1 tylenol. No drops in BP this shift, pt continues to void well. Cooperative but eager to go home. Will continue to monitor.        Problem: Fall Risk (Adult)  Goal: Absence of Fall  Outcome: Ongoing (interventions implemented as appropriate)   12/19/18 0616   Fall Risk (Adult)   Absence of Fall achieves outcome       Problem: Skin Injury Risk (Adult)  Goal: Identify Related Risk Factors and Signs and Symptoms  Outcome: Ongoing (interventions implemented as appropriate)    Goal: Skin Health and Integrity  Outcome: Ongoing (interventions implemented as appropriate)   12/19/18 0616   Skin Injury Risk (Adult)   Skin Health and Integrity achieves outcome       Problem: Pain, Acute (Adult)  Goal: Identify Related Risk Factors and Signs and Symptoms  Outcome: Ongoing (interventions implemented as appropriate)   12/19/18 0616   Pain, Acute (Adult)   Related Risk Factors (Acute Pain) disease process;surgery   Signs and Symptoms (Acute Pain) verbalization of pain descriptors     Goal: Acceptable Pain Control/Comfort Level  Outcome: Ongoing (interventions implemented as appropriate)

## 2018-12-19 NOTE — PLAN OF CARE
Problem: Patient Care Overview  Goal: Plan of Care Review  Outcome: Ongoing (interventions implemented as appropriate)   12/18/18 1800   Coping/Psychosocial   Plan of Care Reviewed With patient;spouse   OTHER   Outcome Summary Pt c/o HA today, tylenol x1 with good relief. Ambulating well and with brace. BP drops to 80's/60's only when lying flat in bed otherwise WNL. He is eager to get home to his own bed. No other complaints, will continue to moniotr. FELICITAS 12/18/18 @ 1800

## 2018-12-20 VITALS
WEIGHT: 182.44 LBS | TEMPERATURE: 97.7 F | DIASTOLIC BLOOD PRESSURE: 83 MMHG | OXYGEN SATURATION: 95 % | SYSTOLIC BLOOD PRESSURE: 123 MMHG | BODY MASS INDEX: 25.54 KG/M2 | HEIGHT: 71 IN | HEART RATE: 84 BPM | RESPIRATION RATE: 18 BRPM

## 2018-12-20 LAB
ANION GAP SERPL CALCULATED.3IONS-SCNC: 8.1 MMOL/L
BUN BLD-MCNC: 13 MG/DL (ref 8–23)
BUN/CREAT SERPL: 15.7 (ref 7–25)
CALCIUM SPEC-SCNC: 8.7 MG/DL (ref 8.6–10.5)
CHLORIDE SERPL-SCNC: 100 MMOL/L (ref 98–107)
CO2 SERPL-SCNC: 23.9 MMOL/L (ref 22–29)
CREAT BLD-MCNC: 0.83 MG/DL (ref 0.76–1.27)
GFR SERPL CREATININE-BSD FRML MDRD: 90 ML/MIN/1.73
GLUCOSE BLD-MCNC: 139 MG/DL (ref 65–99)
GLUCOSE BLDC GLUCOMTR-MCNC: 146 MG/DL (ref 70–130)
GLUCOSE BLDC GLUCOMTR-MCNC: 154 MG/DL (ref 70–130)
GLUCOSE BLDC GLUCOMTR-MCNC: 202 MG/DL (ref 70–130)
POTASSIUM BLD-SCNC: 4.4 MMOL/L (ref 3.5–5.2)
SODIUM BLD-SCNC: 132 MMOL/L (ref 136–145)

## 2018-12-20 PROCEDURE — 82962 GLUCOSE BLOOD TEST: CPT

## 2018-12-20 PROCEDURE — 99024 POSTOP FOLLOW-UP VISIT: CPT | Performed by: NURSE PRACTITIONER

## 2018-12-20 PROCEDURE — 63710000001 INSULIN LISPRO (HUMAN) PER 5 UNITS: Performed by: INTERNAL MEDICINE

## 2018-12-20 PROCEDURE — 80048 BASIC METABOLIC PNL TOTAL CA: CPT | Performed by: INTERNAL MEDICINE

## 2018-12-20 RX ORDER — CHOLECALCIFEROL (VITAMIN D3) 125 MCG
5 CAPSULE ORAL NIGHTLY PRN
COMMUNITY
Start: 2018-12-20

## 2018-12-20 RX ORDER — METHOCARBAMOL 750 MG/1
750 TABLET, FILM COATED ORAL EVERY 6 HOURS PRN
Qty: 40 TABLET | Refills: 1 | Status: SHIPPED | OUTPATIENT
Start: 2018-12-20

## 2018-12-20 RX ORDER — POLYETHYLENE GLYCOL 3350 17 G/17G
17 POWDER, FOR SOLUTION ORAL DAILY
Qty: 119 G | Refills: 0 | Status: SHIPPED | OUTPATIENT
Start: 2018-12-20 | End: 2018-12-31

## 2018-12-20 RX ORDER — HYDROCODONE BITARTRATE AND ACETAMINOPHEN 5; 325 MG/1; MG/1
1 TABLET ORAL EVERY 4 HOURS PRN
Qty: 30 TABLET | Refills: 0 | Status: SHIPPED | OUTPATIENT
Start: 2018-12-20 | End: 2018-12-28

## 2018-12-20 RX ORDER — DOCUSATE SODIUM 100 MG/1
100 CAPSULE, LIQUID FILLED ORAL 2 TIMES DAILY
Status: DISCONTINUED | OUTPATIENT
Start: 2018-12-20 | End: 2018-12-20 | Stop reason: HOSPADM

## 2018-12-20 RX ORDER — POLYETHYLENE GLYCOL 3350 17 G/17G
17 POWDER, FOR SOLUTION ORAL DAILY
Status: DISCONTINUED | OUTPATIENT
Start: 2018-12-20 | End: 2018-12-20 | Stop reason: HOSPADM

## 2018-12-20 RX ORDER — PSEUDOEPHEDRINE HCL 30 MG
100 TABLET ORAL 2 TIMES DAILY
Qty: 30 EACH | Refills: 0 | Status: SHIPPED | OUTPATIENT
Start: 2018-12-20 | End: 2018-12-31

## 2018-12-20 RX ADMIN — GLIPIZIDE 10 MG: 10 TABLET ORAL at 09:23

## 2018-12-20 RX ADMIN — METHOCARBAMOL TABLETS 750 MG: 750 TABLET, COATED ORAL at 09:23

## 2018-12-20 RX ADMIN — DOCUSATE SODIUM 100 MG: 100 CAPSULE, LIQUID FILLED ORAL at 12:45

## 2018-12-20 RX ADMIN — METHOCARBAMOL TABLETS 750 MG: 750 TABLET, COATED ORAL at 12:45

## 2018-12-20 RX ADMIN — INSULIN LISPRO 2 UNITS: 100 INJECTION, SOLUTION INTRAVENOUS; SUBCUTANEOUS at 01:28

## 2018-12-20 RX ADMIN — ACETAMINOPHEN 650 MG: 325 TABLET, FILM COATED ORAL at 05:35

## 2018-12-20 RX ADMIN — HYDROCODONE BITARTRATE AND ACETAMINOPHEN 1 TABLET: 10; 325 TABLET ORAL at 14:04

## 2018-12-20 RX ADMIN — POLYETHYLENE GLYCOL 3350 17 G: 17 POWDER, FOR SOLUTION ORAL at 12:45

## 2018-12-20 RX ADMIN — INSULIN LISPRO 7 UNITS: 100 INJECTION, SOLUTION INTRAVENOUS; SUBCUTANEOUS at 12:45

## 2018-12-20 RX ADMIN — INSULIN LISPRO 1 UNITS: 100 INJECTION, SOLUTION INTRAVENOUS; SUBCUTANEOUS at 05:31

## 2018-12-20 NOTE — DISCHARGE SUMMARY
Claudio Haney  1940    Patient Care Team:  SINCERE Marina MD as PCP - General (General Practice)    Date of Admit: 12/17/2018    Date of Discharge:  12/20/2018    Discharge Diagnosis:  Lumbar stenosis with neurogenic claudication      Procedures Performed  Procedure(s):  Right L4 5 and L5-S1 lumbar discectomy, Right L3 4 lumbar decompression with interbody and posterior lateral fusion and interbody and posterior instrumentation       Complications: none    Consultants:   Consults     Date and Time Order Name Status Description    12/18/2018 0220 Inpatient Pulmonology Consult Completed     12/18/2018 0024 Inpatient Internal Medicine Consult            Condition on Discharge: stable    Discharge disposition: home    Pertinent Test Results: none    Brief HPI: Patient evaluated in office for complaints of back and right leg pain/weakness. Imaging revealed severe lumbar spinal stenosis at L3 4, and disc herniation to the right at L4 5 and at L5-S1 with compromise of the lateral recess and neural foramen.  Flexion-extension x-rays demonstrate mobile spondylolisthesis at L3 4 of 5 mm or more in flexion and with extension this is completely reduced.. RBAs of treatment were discussed including the above procedure. Patient consented to above procedure.    Hospital Course: Patient admitted for above procedure. The procedure itself was without complication. The patient was transferred to ICU following recovery due to hypotension.  He was on IV pressors for a brief period of time, but was doing much better on postoperative day 1.  He transferred to [address] [place] [address].  He is ambulating with walker.  He did better yesterday with physical therapy.  He is voiding, passing gas, tolerating diet.  He takes minimal narcotics per his request.  He is taking Tylenol.  His pain is not as well controlled as I would like and I recommend that he try the muscle relaxant for which she is agreeable.  His strength is baseline.  He does feel ready  for discharge today.  Vitals are stable.  He denies any right leg pain.  I recommended home health physical therapy.  He is aware to wear his brace at home.  He states that he will be accompanied by family at home.    Discharge Physical Exam:    Temp:  [97.7 °F (36.5 °C)-98.7 °F (37.1 °C)] 97.7 °F (36.5 °C)  Heart Rate:  [72-90] 84  Resp:  [18-23] 18  BP: (105-129)/(58-84) 123/83    Current labs:  Lab Results (last 24 hours)     Procedure Component Value Units Date/Time    POC Glucose Once [[id number]]  (Abnormal) Collected:  12/19/18 1125    Specimen:  Blood Updated:  12/19/18 1140     Glucose 268 mg/dL     POC Glucose Once [[id number]]  (Abnormal) Collected:  12/19/18 1536    Specimen:  Blood Updated:  12/19/18 1607     Glucose 151 mg/dL     POC Glucose Once [[id number]]  (Normal) Collected:  12/19/18 2217    Specimen:  Blood Updated:  12/19/18 2219     Glucose 130 mg/dL     POC Glucose Once [[id number]]  (Abnormal) Collected:  12/20/18 0053    Specimen:  Blood Updated:  12/20/18 0054     Glucose 154 mg/dL     POC Glucose Once [[id number]]  (Abnormal) Collected:  12/20/18 0525    Specimen:  Blood Updated:  12/20/18 0526     Glucose 146 mg/dL     Basic Metabolic Panel [[id number]]  (Abnormal) Collected:  12/20/18 0634    Specimen:  Blood Updated:  12/20/18 0801     Glucose 139 mg/dL      BUN 13 mg/dL      Creatinine 0.83 mg/dL      Sodium 132 mmol/L      Potassium 4.4 mmol/L      Chloride 100 mmol/L      CO2 23.9 mmol/L      Calcium 8.7 mg/dL      eGFR Non African Amer 90 mL/min/1.73      BUN/Creatinine Ratio 15.7     Anion Gap 8.1 mmol/L     Narrative:       The MDRD GFR formula is only valid for adults with stable renal function between ages 18 and 70.        Results from last 7 days   Lab Units  12/19/18   0724  12/18/18   0631  12/17/18   2209   WBC 10*3/mm3  12.48*  14.30*  15.66*   HEMOGLOBIN g/dL  11.4*  11.6*  12.2*   HEMATOCRIT %  37.2*  37.5*  40.1*   PLATELETS 10*3/mm3  193  169  264         General Appearance  No acute distress   Neurological Awake, Alert, and oriented x 3   Motor Strength 5/5 except right quad and IP 4/5, tone normal   Sensory Intact to light touch taco LE   Gait and station Ambulating with walker per PT notes   Back Lateral lumbar incisions well approximated with no redness or drainage.  Tenderness and mild swelling expected.     Extremities Moves all extremities well, no edema, no cyanosis, no redness         Discharge Medications  Giovani has been reviewed and narcotic consent is on file in the patient's chart.     Your medication list      START taking these medications      Instructions Last Dose Given Next Dose Due   docusate sodium 100 MG capsule      Take 100 mg by mouth 2 (Two) Times a Day.       HYDROcodone-acetaminophen 5-325 MG per tablet  Commonly known as:  NORCO      Take 1 tablet by mouth Every 4 (Four) Hours As Needed for Moderate Pain  for up to 8 days.       melatonin 5 MG tablet tablet      Take 1 tablet by mouth At Night As Needed (insomnia).       methocarbamol 750 MG tablet  Commonly known as:  ROBAXIN      Take 1 tablet by mouth Every 6 (Six) Hours As Needed for Muscle Spasms.       polyethylene glycol packet  Commonly known as:  MIRALAX      Take 17 g by mouth Daily.          CHANGE how you take these medications      Instructions Last Dose Given Next Dose Due   aspirin 81 MG tablet  What changed:  additional instructions      Take 1 tablet by mouth Daily. May resume on 12/22          CONTINUE taking these medications      Instructions Last Dose Given Next Dose Due   amLODIPine-benazepril 10-40 MG per capsule  Commonly known as:  LOTREL      Take 1 capsule by mouth Daily.       glimepiride 4 MG tablet  Commonly known as:  AMARYL      Take 4 mg by mouth Every Morning Before Breakfast.       metFORMIN 1000 MG tablet  Commonly known as:  GLUCOPHAGE      Take 1,000 mg by mouth Daily With Breakfast.       OZEMPIC 0.25 or 0.5 MG/DOSE solution pen-injector  Generic drug:  Semaglutide       Inject 0.5 mL under the skin into the appropriate area as directed 1 (One) Time Per Week. Sat Noon       simvastatin 40 MG tablet  Commonly known as:  ZOCOR      Take 40 mg by mouth Every Night.       TOUANNA SOLOSTAR SC      Inject 12 Units under the skin into the appropriate area as directed Every Night.          STOP taking these medications    EXCEDRIN PO              Where to Get Your Medications      These medications were sent to [facility] [address] [place] [address] [address] [place] [address] AT [facility] [phone] [facility] [phone] [address] [address]    Phone:  [phone]   · docusate sodium 100 MG capsule  · methocarbamol 750 MG tablet  · polyethylene glycol packet     You can get these medications from any pharmacy    Bring a paper prescription for each of these medications  · HYDROcodone-acetaminophen 5-325 MG per tablet  You don't need a prescription for these medications  · melatonin 5 MG tablet tablet     Information about where to get these medications is not yet available    Ask your nurse or doctor about these medications  · aspirin 81 MG tablet         Discharge Diet:   Diet Instructions     Diet:      Diet Texture / Consistency:  Regular    Advance as tolerated        Diet Order   Procedures   • Diet Regular; Consistent Carbohydrate       Activity at Discharge:   Activity Instructions     Discharge Activity      1) No driving until cleared by us and no longer taking narcotics.   2) Off work/school until cleared by us.  3) May shower but no submerging wound in tub, pool, etc.  4) Do not lift / push / pull more then 5 lbs.  5.) Wear brace at all times when sitting more than 30 degrees, standing, walking  6.) No overhead activity  7.) No bending or twisting    Pelvic Rest            Call for: questions or concerns    Follow-up Appointments  Future Appointments   Date Time Provider Department Center   12/31/2018  9:00 AM Airam Maria APRN [facility]  [facility] None     Follow-up Information     SINCERE Marina MD .    Specialty:  General Practice  Contact information:  [address] [address]  [phone]                 Additional Instructions for the Follow-ups that You Need to Schedule     Ambulatory Referral to Home Health   As directed      Face to Face Visit Date:  12/20/2018    Follow-up Provider for Plan of Care?:  I will be treating the patient on an ongoing basis.  Please send me the Plan of Care for signature.    Follow-up Provider:  RUSSELL SHERWOOD [[id number]]    Reason/Clinical Findings:  PO lumbar fusion    Describe mobility limitations that make leaving home difficult:  PO lumbar fusion; limited mobility    Nursing/Therapeutic Services Requested:  Physical Therapy    PT orders:  Gait Training Transfer training Therapeutic exercise Strengthening Home safety assessment    Weight Bearing Status:  As Tolerated    Frequency:  Other (2 times weekly)         Notify Physician or Go To The ED For the Following Conditions   As directed      Including but not limited to fever >100.5, chills, wound concerns (redness, swelling, drainage), new symptoms of numbness, tingling, weakness; new or uncontrolled pain despite using prescribed medications    Order Comments:  Including but not limited to fever >100.5, chills, wound concerns (redness, swelling, drainage), new symptoms of numbness, tingling, weakness; new or uncontrolled pain despite using prescribed medications                Test Results Pending at Discharge     None    I discussed the discharge instructions with patient    Dodie NILSON Carnes  12/20/18  9:50 AM    30 min spent in reviewing records, discussion and examination of the patient and discussion with other members of the patient's medical team.

## 2018-12-20 NOTE — PLAN OF CARE
Problem: Patient Care Overview  Goal: Plan of Care Review  Outcome: Ongoing (interventions implemented as appropriate)   12/20/18 0272   Coping/Psychosocial   Plan of Care Reviewed With patient   Plan of Care Review   Progress improving   OTHER   Outcome Summary VSS. A&O x4. Pain well managed with PO analgesics. Incisions benign.

## 2018-12-20 NOTE — PROGRESS NOTES
Continued Stay Note  [facility]     Patient Name: Claudio Haney  MRN: [id number]  Today's Date: 12/20/2018    Admit Date: 12/17/2018    Discharge Plan     Row Name 12/20/18 0929       Plan    Plan  Home w/ family and home health.    Patient/Family in Agreement with Plan  yes    Plan Comments  Met w/ patient in room.  Introduced self and explained reason for visit.  Confirmed walker needed for patient and pt is agreeable to [employer] to deliver to room today prior to d/c.  Pt also agreeable to home health/ referral obtained.                     Davy Hoyt RN

## 2018-12-20 NOTE — DISCHARGE PLACEMENT REQUEST
"Claudio Deleon (78 y.o. Male)     Date of Birth Social Security Number Address Home Phone MRN    1940  [id number] [facility] [address] [facility] [address] [phone] [id number]    Syn[employer] Marital Status          Church        Admission Date Admission Type Admitting Provider Attending Provider Department, Room/Bed    12/17/18 Elective Denis Flores MD Hodes, Jonathan E, MD [address], P589/1    Discharge Date Discharge Disposition Discharge Destination                       Attending Provider:  Denis Flores MD    Allergies:  No Known Allergies    Isolation:  None   Infection:  None   Code Status:  CPR    Ht:  180.3 cm (71\")   Wt:  82.8 kg (182 lb 7 oz)    Admission Cmt:  None   Principal Problem:  Lumbar stenosis with neurogenic claudication [M48.062]                 Active Insurance as of 12/17/2018     Primary Coverage     Payor Plan Insurance Group Employer/Plan Group    MEDICARE MEDICARE A & B      Payor Plan Address Payor Plan Phone Number Payor Plan Fax Number Effective Dates    [address] [phone]  9/1/2007 - None Entered    [facility] [id number]       Subscriber Name Subscriber Birth Date Member ID       CLAUDIO DELEON 1940 [id number]           Secondary Coverage     Payor Plan Insurance Group Employer/Plan Group    [facility] SUPP [id number]     Payor Plan Address Payor Plan Phone Number Payor Plan Fax Number Effective Dates    [address]   12/1/2016 - None Entered    [facility] [id number]       Subscriber Name Subscriber Birth Date Member ID       CLAUDIO DELEON 1940 [id number]                 Emergency Contacts      (Rel.) Home Phone Work Phone Mobile Phone    Claudio Deleon (Son) [phone] -- [phone]              "

## 2018-12-21 ENCOUNTER — READMISSION MANAGEMENT (OUTPATIENT)
Dept: CALL CENTER | Facility: HOSPITAL | Age: 78
End: 2018-12-21

## 2018-12-22 NOTE — OUTREACH NOTE
Prep Survey      Responses   Facility patient discharged from?  [facility]   Is patient eligible?  Yes   Discharge diagnosis  right lumbar discectomy and fusion   Does the patient have one of the following disease processes/diagnoses(primary or secondary)?  General Surgery   Does the patient have Home health ordered?  Yes   What is the Home health agency?   [facility]   Is there a DME ordered?  Yes   What DME was ordered?  walker from [facility]   Medication alerts for this patient  ASA change   Prep survey completed?  Yes          Samara Phillips RN

## 2018-12-23 ENCOUNTER — READMISSION MANAGEMENT (OUTPATIENT)
Dept: CALL CENTER | Facility: HOSPITAL | Age: 78
End: 2018-12-23

## 2018-12-23 NOTE — OUTREACH NOTE
General Surgery Week 1 Survey      Responses   Facility patient discharged from?  [facility]   Does the patient have one of the following disease processes/diagnoses(primary or secondary)?  General Surgery   Is there a successful TCM telephone encounter documented?  No   Week 1 attempt successful?  Yes   Call start time  0826   Call end time  0831   Is patient permission given to speak with other caregiver?  No   Meds reviewed with patient/caregiver?  Yes   Is the patient having any side effects they believe may be caused by any medication additions or changes?  Yes   Side effects comments   had some conspitation, which is now resovled from the pain medication is taking stool softners and mirlax as prescribed   Does the patient have all medications related to this admission filled (includes all antibiotics, pain medications, etc.)  Yes   Is the patient taking all medications as directed (includes completed medication regime)?  Yes   Medication comments  ha   Does the patient have a follow up appointment scheduled with their surgeon?  Yes   Has the patient kept scheduled appointments due by today?  N/A   Comments  seeing his surgeon on 12/31   What is the Home health agency?   [facility]    What DME was ordered?  walker from [facility]   Did the patient receive a copy of their discharge instructions?  Yes   Nursing interventions  Reviewed instructions with patient   What is the patient's perception of their health status since discharge?  Improving   Is the patient /caregiver able to teach back basic post-op care?  Take showers only when approved by MD-sponge bathe until then, Drive as instructed by MD in discharge instructions, Keep incision areas clean,dry and protected   Is the patient/caregiver able to teach back signs and symptoms of incisional infection?  Increased redness, swelling or pain at the incisonal site, Increased drainage or bleeding, Incisional warmth, Pus or odor from incision, Fever   Is the  patient/caregiver able to teach back steps to recovery at home?  Rest and rebuild strength, gradually increase activity   Week 1 call completed?  Yes   Wrap up additional comments  rates pain 4-5/10          Claudia Santos RN

## 2018-12-26 NOTE — OP NOTE
Right L3 4, L4 5, and L5-S1 lumbar decompression with transforaminal lumbar interbody fusion at L3 4 and posterior lateral fusion and instrumentation.    Procedure Note    Claudio Haney  12/17/2018 - 12/18/2018  [id number]    SURGEON  Denis Flores MD    ASSISTANT:  EDGARDO Nava  INDICATION:  sevfere right leg pain and lumbar stensois    Pre-op Diagnosis:   Lumbar stenosis with neurogenic claudication [M48.062]    Post-Op Diagnosis Codes:     * Lumbar stenosis with neurogenic claudication [M48.062]    PROCEDURE PERFORMED:  Procedure(s):  Right L4 5 and L5-S1 lumbar discectomy, Right L3 4 lumbar decompression with interbody and posterior lateral fusion and interbody and posterior instrumentation      1.  L 34, L4 5, L5-S1  lumbar decompressive laminectomy medial facetectomy and foraminotomies bilateral  2.  L 3/4 Interbody arthrodesis  3.  [place] of autograft bone from the same incision  4.  Placement of interbody prosthetic device at L3 4  5. Microdissection technique and micro-illumination with the aid of the microscope  6. Continuous EMG throughout the operative intervention - 4 hours  7.  Neural junction monitoring test × 4  8.  Posterior lumbar instrumentation L 3-4 -segmental  9.  Preoperative planning and intraoperative stereotactic guidance with the use of the Stealth navigation system and O arm intraoperative imagery  10.  Posterior lateral arthrodesis L3 to L4 transverse process technique    Anesthesia: General    Staff:   Circulator: Nury Amor RN; Treva Kelly, RUDOLPH; Chantel Tuttle RN  Radiology Technologist: Adrien Frederick, CATRINA  Scrub Person: Helen Fan; Maria Eugenia Phillips Daniel  Vendor Representative: Paddy Smith  Assistant: Maria Eugenia Gautam CSA    Estimated Blood Loss: 100ml    Specimens:   Order Name Source Comment Collection Info Order Time   BASIC METABOLIC PANEL   Collected By: Elidia Rodriguez 12/17/2018 10:01 PM   CBC (NO DIFF)   Collected By: Cecile Pa  RN 12/17/2018 10:01 PM   BASIC METABOLIC PANEL   Collected By: Leidy Lock 12/18/2018 12:24 AM   TYPE AND SCREEN   Collected By: Tanya Porter RN 12/17/2018 11:53 AM                  Drains:   [REMOVED] Urethral Catheter Silicone 16 Fr. (Removed)   Daily Indications < 24 hr post op 12/18/2018  4:00 AM   Site Assessment Clean;Skin intact 12/18/2018  4:00 AM   Collection Container Standard drainage bag 12/18/2018  4:00 AM   Securement Method Securing device 12/18/2018  4:00 AM   Output (mL) 1300 mL 12/18/2018  6:00 AM       Findings: Very severe lumbar spinal stenosis at each level.  Disc herniation complicating the L4 5 interspace and further narrowing the spinal canal.  Spinal stenosis on the basis of severe ligamentum flavum and facet hypertrophy and disc osteophyte complexes.    Complications: None    Details:  Positioning/EMG electrodes/ continuous EMG recording/ and neural junction monitoring: The patient was brought to the operating room where general endotracheal anesthesia was induced.   Supine on the rney EMG electrodes were placed into the adductor medius, vastus lateralis, extensor hallucis longus, and medial gastrocnemius muscles bilaterally.  Continuous EMG monitoring was then accomplished throughout the  procedure.  No changes in EMG electrode recording were noted that could not be rapidly changed and eliminated by a minor change and operative intervention.  Neural junction monitoring: During placement of pedicle screws and electrified 5.5 mm tap at 6 mA was advanced under stereotactic guidance into the pedicles at L3 and L4  bilaterally.  A K wire was placed.  Finally placement of the pedicle screws was accomplished with electrification of the screws to 20 mA.  No abnormal EMG activity was noted during the neural junction monitoring tests.    Positioning/approach:  Once the patient was placed onto the Nitin table care was taken to pad all susceptible areas.  The lumbar area was prepped  and draped in the usual sterile manner.  Under fluoroscopic guidance a post was placed into the left iliac crest and attached to a stereotactic array.  A rotational CT was then accomplished and reconstructed on a separate workstation for stereotactic guidance.  Using a stereotactic localizing device identify the appropriate entry point over each pedicle at L3  and L4 and local anesthesia was injected and then an incision was made approximately 4 cm from midline on either side measuring approximately 2-1/2 cm in length.  Incisions were also made on the right side identifying the location of L4 5 and L5-S1.  These were quite a bit smaller sessions.    Lumbar decompression: On the right side a short K wire was used under stereotactic guidance to identify the interspaces at L3/4, L4/5, and L5/S1.  And then a series of telescoping dilators up to 18 mm were used at L4/5 and L5/S1 up to 26 mm at L3/4 and then a 18 x 6 cm tubular retractor was placed at L4/5, and 18 X 5 cm placed at L5/S1 and a 26 X 6 cm tubular retractor placed at L3/4. Once in place soft tissue was removed from the bone with Bovie cautery.    Using the high-speed drill I then removed the lamina bilaterally of L L4 and L5 and the inferior lamina of L3 and the superior lamina of S1 and I removed the facet on the right and the medial aspect of the facet on the left side at L3 4.  I completed the facetectomy on the right and the left.  I encountered significant thickened ligamentum flavum which was removed.  I identified the exiting L3, 4, and 5  nerve root and the traversing L4, 5, and S1 nerve roots and made sure that they were completely decompressed.  Prior to decompression they were obviously compressed by the surrounding tissue by the end of the procedure I could pass a Mirza ball hook both out into the neural foramen alongside the exiting nerve root and along side the traversing nerve root inside the spinal canal without disturbing the nerve  roots.    Placement of localizing K wires under stereotactic guidance: At L3 4 and I advanced a stereotactically guided small dilator to the pedicle of L3 and L4 bilaterally.  Sequentially I dilated up to the insulated dilator and using an electrified 5.5 mm tap I advanced into each pedicle as described above. The L3 and L4  pedicles were targeted bilaterally.  Once in place C arm x-ray confirmation of placement was accomplished.   A long K wire was placed into each pedicle.  On the patient's left side over each K wire I then  placed the screws.  No abnormal EMG electrical activity occurred.  The  moni was then placed using sextant technology and the disc space was distracted.    Microscope: The operating microscope was draped sterilely and brought into the field and the rest of the operation performed with micro- dissection technique and micro-illumination.    [place] of autograft bone: All bone removed was harvested and used for the interbody arthrodesis.  A bone  was placed in the suction line and all suction drill trailing's were collected and used for arthrodesis.    Interbody arthrodesis: I entered the disc space on the right side at L3/4 retracting the dural tube medially.  A formal discectomy was then performed with curettes and pituitary rongeurs.  I scraped the endplates very well down to bleeding bone.    Interbody prosthetic device: I sized to Elevate graft.  I stuffed the interspace with the autograft bone and then I stuffed the graft itself with the drill trailing's and the autograft bone that was collected.  The device was then impacted into the interspace and x-ray confirmation of placement completely within the interspace and in the midline was accomplished.    Posterior lateral arthrodesis: I then swung the 18 mm tube laterally identifying the transverse process of L3 and L4 on the right side.  The high-speed drill I decorticated the bone.  I then placed the harvested autograft bone wrapped  in Surgicel onto the transverse processes creating a bridge between the transverse processes and the lateral aspect of the pedicle.    I then placed pedicle screws on the right side over the existing K wires using the electrified tap and the electrified screws.  I then placed a moni on the right side with sextant technology.     Copious irrigation was used hemostasis was obtained the wound was then closed and appropriate layers skin reapproximated with Dermabond        Denis Flores MD     Date: 12/26/2018  Time: 10:52 AM

## 2018-12-31 ENCOUNTER — OFFICE VISIT (OUTPATIENT)
Dept: NEUROSURGERY | Facility: CLINIC | Age: 78
End: 2018-12-31

## 2018-12-31 ENCOUNTER — HOSPITAL ENCOUNTER (OUTPATIENT)
Dept: GENERAL RADIOLOGY | Facility: HOSPITAL | Age: 78
Discharge: HOME | End: 2018-12-31
Admitting: NURSE PRACTITIONER

## 2018-12-31 VITALS
HEART RATE: 68 BPM | RESPIRATION RATE: 16 BRPM | SYSTOLIC BLOOD PRESSURE: 120 MMHG | BODY MASS INDEX: 25.8 KG/M2 | DIASTOLIC BLOOD PRESSURE: 68 MMHG | WEIGHT: 185 LBS | TEMPERATURE: 97.9 F

## 2018-12-31 DIAGNOSIS — M62.559 ATROPHY OF QUADRICEPS FEMORIS MUSCLE: ICD-10-CM

## 2018-12-31 DIAGNOSIS — M43.10 ACQUIRED SPONDYLOLISTHESIS: Primary | ICD-10-CM

## 2018-12-31 DIAGNOSIS — M51.26 LUMBAR DISC HERNIATION: ICD-10-CM

## 2018-12-31 DIAGNOSIS — Z98.890 POST-OPERATIVE STATE: ICD-10-CM

## 2018-12-31 DIAGNOSIS — M48.061 SPINAL STENOSIS, LUMBAR REGION, WITHOUT NEUROGENIC CLAUDICATION: ICD-10-CM

## 2018-12-31 PROCEDURE — 99024 POSTOP FOLLOW-UP VISIT: CPT | Performed by: NURSE PRACTITIONER

## 2018-12-31 PROCEDURE — 72100 X-RAY EXAM L-S SPINE 2/3 VWS: CPT

## 2018-12-31 NOTE — PROGRESS NOTES
HPI:   Claudio Haney is a 78 y.o. male for follow-up status post L4 5 and L5-S1 discectomy and decompression with PLIF with Dr. Flores on December 13, 2018.  Surgery was performed secondary to severe lumbar spinal stenosis with neurogenic claudication.    He continues to report complete resolution of right leg pain.  He does have significant ongoing right leg weakness, specifically in the right quad with ongoing atrophy.  He has been walking daily since surgery.  He is off all narcotic medication and muscle relaxers.  He is taking Tylenol as needed for any discomfort.  He is ready to resume driving.  He has been wearing the [employer] brace as directed.  He denies any new problems.  He would like to take a bath.    He presents unaccompanied    Review of Systems   Constitutional: Negative for fever.   HENT: Negative for trouble swallowing.    Genitourinary: Negative for difficulty urinating.   Musculoskeletal: Positive for back pain and gait problem.   Skin: Negative for wound.   Neurological: Positive for weakness (RLE). Negative for numbness.   Psychiatric/Behavioral: Negative for sleep disturbance.        /68 (BP Location: Left arm, Patient Position: Sitting, Cuff Size: Adult)   Pulse 68   Temp 97.9 °F (36.6 °C) (Tympanic)   Resp 16   Wt 83.9 kg (185 lb)   BMI 25.80 kg/m²     Physical Exam   Constitutional: He is oriented to person, place, and time. Vital signs are normal. He appears well-developed and well-nourished. He is cooperative.  Non-toxic appearance. He does not have a sickly appearance. He does not appear ill.   Very pleasant, well-appearing older gentleman   HENT:   Head: Normocephalic and atraumatic.   Eyes: EOM are normal.   Neck: Neck supple. No tracheal deviation present.   Pulmonary/Chest: Effort normal.   Abdominal: Soft.   Musculoskeletal: Normal range of motion. He exhibits no deformity.        Lumbar back: He exhibits tenderness. He exhibits no pain.   4/5 R quad weakness   Deferred Lumbar  ROM  Very minimal tenderness surrounding the incision site  Wearing a well fitting LSO brace   Neurological: He is alert and oriented to person, place, and time. He displays atrophy. He displays no tremor. No sensory deficit. Coordination and gait normal. GCS eye subscore is 4. GCS verbal subscore is 5. GCS motor subscore is 6.   Reflex Scores:       Patellar reflexes are 2+ on the right side and 2+ on the left side.       Achilles reflexes are 2+ on the right side and 2+ on the left side.  Gait is stable and upright, able to walk on heels and toes  Right quad atrophy unchanged from preop     Skin: Skin is warm and dry.   Well-healed bilateral lumbar incision sites, flat, normal sounding skin, very minimal tenderness, well approximated   Psychiatric: He has a normal mood and affect. His behavior is normal. Thought content normal.   Vitals reviewed.    Neurologic Exam     Mental Status   Oriented to person, place, and time.     Cranial Nerves     CN III, IV, VI   Extraocular motions are normal.     Gait, Coordination, and Reflexes     Reflexes   Right patellar: 2+  Left patellar: 2+  Right achilles: 2+  Left achilles: 2+      Findings/Results:  Postop lumbar x-rays reveal intact and stable surgical hardware, no new abnormalities noted.    Assessment/Plan:  Claudio was seen today for post-op.    Diagnoses and all orders for this visit:    Acquired spondylolisthesis L3/4 Grade I  -     Ambulatory Referral to Physical Therapy Evaluate and treat; Stretching, ROM, Strengthening; Full weight bearing    Atrophy of right quadriceps femoris muscle  -     Ambulatory Referral to Physical Therapy Evaluate and treat; Stretching, ROM, Strengthening; Full weight bearing    Left  L4/5 and L5/[id number] disc herniation  -     Ambulatory Referral to Physical Therapy Evaluate and treat; Stretching, ROM, Strengthening; Full weight bearing        Discussion/Summary  He presents to the office today for first postop visit status post 2 level  decompression and one level lumbar fusion with Dr. Flores on December 13.  Exam as noted above, no red flags.  He is doing very well and has had complete resolution of preoperative right leg pain.  He continues to have significant weakness in the right quad but this has improved postop.  I've ordered him to begin outpatient physical therapy as he is now able to drive since he is off all narcotic medication.  I have discontinued the [facility] home health nursing and PT.  The incision sites are healing well.  He has been compliant wearing the LSO brace.  He is not to bathe for at least 2 more weeks or until the incision sites are completely healed.  He is to refrain from bending and twisting at the waist for at least 6 more weeks.  Okay to lift upwards of 10-15 pounds.  He is to wear the LSO brace at all times as directed.  We will see him back in 2 months for second postop visit.      He is very happy with his postop status.  He is to call at anytime with any questions or concerns.  Postop x-rays reveal stable findings with intact surgical hardware.    Plan: Return in about 2 months (around 2/28/2019)., Ambulatory referral to PT         Patient Care Team    Patient Care Team:  SINCERE Marina MD as PCP - General (General Practice)    Airam Maria, APRN  12/31/2018    Dragon disclaimer:   Much of this encounter note is an electronic transcription/translation of spoken language to printed text. The electronic translation of spoken language may permit erroneous, or at times, nonsensical words or phrases to be inadvertently transcribed; Although I have reviewed the note for such errors, some may still exist.

## 2019-02-28 ENCOUNTER — TELEPHONE (OUTPATIENT)
Dept: NEUROSURGERY | Facility: CLINIC | Age: 79
End: 2019-02-28

## 2019-02-28 NOTE — TELEPHONE ENCOUNTER
Received fax from [facility].  Spoke with pt and made him aware that Methocarbamol is not something we would give him long-term as well as the med not being formulary.  Pt was told to call PCP ([facility]) if he wants to stay on the med.

## 2019-03-11 ENCOUNTER — OFFICE VISIT (OUTPATIENT)
Dept: NEUROSURGERY | Facility: CLINIC | Age: 79
End: 2019-03-11

## 2019-03-11 VITALS
SYSTOLIC BLOOD PRESSURE: 132 MMHG | DIASTOLIC BLOOD PRESSURE: 80 MMHG | WEIGHT: 187 LBS | BODY MASS INDEX: 26.18 KG/M2 | HEART RATE: 70 BPM | RESPIRATION RATE: 18 BRPM | HEIGHT: 71 IN

## 2019-03-11 DIAGNOSIS — Z09 POSTOP CHECK: Primary | ICD-10-CM

## 2019-03-11 PROBLEM — M51.26 LUMBAR DISC HERNIATION: Status: RESOLVED | Noted: 2018-09-25 | Resolved: 2019-03-11

## 2019-03-11 PROBLEM — M48.062 LUMBAR STENOSIS WITH NEUROGENIC CLAUDICATION: Status: RESOLVED | Noted: 2018-09-25 | Resolved: 2019-03-11

## 2019-03-11 PROBLEM — M43.10 ACQUIRED SPONDYLOLISTHESIS: Status: RESOLVED | Noted: 2018-09-25 | Resolved: 2019-03-11

## 2019-03-11 PROCEDURE — 99024 POSTOP FOLLOW-UP VISIT: CPT | Performed by: NURSE PRACTITIONER

## 2019-03-11 NOTE — PROGRESS NOTES
Subjective   Patient ID: Claudio Haney is a 78 y.o. male is here today for 2nd post op, status post lumbar discectomy.    History of Present Illness  Patient presents now 3 months status post L4/5 and L5/S1 PLIF for severe lumbar stenosis with neurogenic claudication. He had 2 months of PT and released. He states he has not worn his brace in 1 month. He denies leg or back pain. No weakness. He is pleased with his results.  From February 28, 2019 with from results physiotherapy reviewed.  Patient had 89% reported improvement following physical therapy.  He had increased range of motion, mobility, strength, balance, general function.  He was discharged to home therapy on that date.    The following portions of the patient's history were reviewed and updated as appropriate: allergies, current medications and problem list.    Review of Systems   Musculoskeletal: Negative for back pain.   Skin: Negative for rash.   Neurological: Negative for dizziness and light-headedness.   Psychiatric/Behavioral: Negative for sleep disturbance.       Objective   Physical Exam   Constitutional: He appears well-developed and well-nourished.   Pulmonary/Chest: Effort normal.   Musculoskeletal:        Lumbar back: He exhibits decreased range of motion (mild). He exhibits no tenderness, no bony tenderness and no pain.   Neurological: He is alert. Gait normal.   Reflex Scores:       Tricep reflexes are 2+ on the right side and 2+ on the left side.       Patellar reflexes are 1+ on the right side and 1+ on the left side.  Skin: Skin is warm and dry.   Multiple lumbar incisions well-healed   Vitals reviewed.    Neurologic Exam     Mental Status   Level of consciousness: alert  Normal comprehension.     Motor Exam   Muscle bulk: normal    Strength   Strength 5/5 except as noted.   Right iliopsoas: 4/5    Sensory Exam   Right leg light touch: normal  Left leg light touch: normal  Right leg vibration: decreased from toes  Left leg vibration:  decreased from ankle    Gait, Coordination, and Reflexes     Gait  Gait: normal    Reflexes   Right triceps: 2+  Left triceps: 2+  Right patellar: 1+  Left patellar: 1+  Right ankle clonus: absent  Left ankle clonus: absent  Able to heel and toe walk bilaterally       Assessment/Plan   Independent Review of Radiographic Studies:    No new imaging    Medical Decision Making:    Patient presents for follow-up 3 months status post 2 level lumbar decompression with PLIF.  He is doing excellent.  No residual back or leg pain.  He reports coming out of his brace about a month ago.  He reports compliance with his bone growth stimulator.  He completed physical therapy and had excellent improvement over the course of his sessions.  His exam is as noted above.  He continues to have some mild right quadriceps weakness, but he feels that is much improved.  He is not having any walking difficulties.  He is interested in resuming gardening.  I recommended one last clinical follow-up in 3 months with lumbar x-rays including flexion-extension.  With regard to activity, I recommend that he avoid repetitive bending and twisting especially with guarding on ground-level.  He might benefit from wearing his brace when he is out doing yard work.  Otherwise, slow increase in activity as tolerated.    Plan: Return to office 3 months with lumbar x-rays including flexion-extension.    Claudio was seen today for post-op.    Diagnoses and all orders for this visit:    Postop check  -     XR Spine Lumbar Complete With Flex & Ext; Future      Return in about 3 months (around 6/11/2019) for with imaging, Follow-up with Dr. Flores.

## 2019-06-11 ENCOUNTER — OFFICE VISIT (OUTPATIENT)
Dept: NEUROSURGERY | Facility: CLINIC | Age: 79
End: 2019-06-11

## 2019-06-11 ENCOUNTER — HOSPITAL ENCOUNTER (OUTPATIENT)
Dept: GENERAL RADIOLOGY | Facility: HOSPITAL | Age: 79
Discharge: HOME | End: 2019-06-11
Admitting: NURSE PRACTITIONER

## 2019-06-11 VITALS
HEIGHT: 71 IN | SYSTOLIC BLOOD PRESSURE: 124 MMHG | WEIGHT: 186 LBS | HEART RATE: 67 BPM | BODY MASS INDEX: 26.04 KG/M2 | RESPIRATION RATE: 16 BRPM | DIASTOLIC BLOOD PRESSURE: 68 MMHG

## 2019-06-11 DIAGNOSIS — Z09 POSTOP CHECK: ICD-10-CM

## 2019-06-11 DIAGNOSIS — Z98.890 POST-OPERATIVE STATE: Primary | ICD-10-CM

## 2019-06-11 DIAGNOSIS — Z98.1 S/P LUMBAR FUSION: ICD-10-CM

## 2019-06-11 PROCEDURE — 72114 X-RAY EXAM L-S SPINE BENDING: CPT

## 2019-06-11 PROCEDURE — 99214 OFFICE O/P EST MOD 30 MIN: CPT | Performed by: NEUROLOGICAL SURGERY

## 2019-06-11 RX ORDER — NITROGLYCERIN 0.4 MG/1
TABLET SUBLINGUAL
COMMUNITY
Start: 2019-05-31

## 2019-06-11 RX ORDER — ROSUVASTATIN CALCIUM 20 MG/1
TABLET, COATED ORAL
COMMUNITY
Start: 2019-05-28

## 2019-06-11 RX ORDER — TICAGRELOR 90 MG/1
TABLET ORAL
COMMUNITY
Start: 2019-05-28

## 2019-06-11 NOTE — PROGRESS NOTES
"Subjective   Patient ID: Claudio Haney is a 78 y.o. male is here today for follow-up back pain. Patient presents unaccompanied.     History of Present Illness    He returns to the office today for ongoing follow-up with history of low back pain.  He is status post L4-5 and L5-S1 PLIF for severe lumbar spinal stenosis with neurogenic claudication.    He is doing very well postop and reports complete resolution of leg pain.  He also states that the right leg is getting stronger.  He denies any ongoing balance or gait instability.  He had a heart attack in April requiring stent placement in the LAD.  He is now on Brilinta.  He is doing cardiac rehab and reports that this is helping with the ongoing low back discomfort.  He is very pleased with his postoperative status.  He denies any new problems.    He presents unaccompanied.        /68 (BP Location: Left arm, Patient Position: Sitting)   Pulse 67   Resp 16   Ht 180.3 cm (71\")   Wt 84.4 kg (186 lb)   BMI 25.94 kg/m²       The following portions of the patient's history were reviewed and updated as appropriate: allergies, current medications, past family history, past medical history, past social history, past surgical history and problem list.      Review of Systems   Genitourinary: Negative for difficulty urinating and enuresis.   Musculoskeletal: Positive for back pain (very little). Negative for arthralgias (denies leg pain).   Neurological: Negative for weakness and numbness.   Psychiatric/Behavioral: Positive for sleep disturbance.       Objective   Physical Exam   Constitutional: He is oriented to person, place, and time. He appears well-developed and well-nourished. He is cooperative.  Non-toxic appearance. He does not have a sickly appearance. He does not appear ill.   Very pleasant well-appearing older gentleman   HENT:   Head: Normocephalic and atraumatic.   Eyes: EOM are normal. Pupils are equal, round, and reactive to light.   Neck: Neck supple. " No tracheal deviation present.   Pulmonary/Chest: Effort normal.   Musculoskeletal: Normal range of motion. He exhibits no tenderness or deformity.        Lumbar back: He exhibits normal range of motion, no tenderness, no bony tenderness and no pain.   Moving all extremities well, strength equal and intact bilateral lower extremities, full lumbar range of motion  Strength equal and intact bilateral lower extremities   Neurological: He is alert and oriented to person, place, and time. He has normal strength. He displays no tremor. No sensory deficit. He exhibits normal muscle tone. Coordination and gait normal. GCS eye subscore is 4. GCS verbal subscore is 5. GCS motor subscore is 6.   Gait is stable and upright     Skin: Skin is warm and dry.   Well-healed lumbar incision sites   Psychiatric: He has a normal mood and affect. His behavior is normal. Thought content normal.   Vitals reviewed.    Neurologic Exam     Mental Status   Oriented to person, place, and time.     Cranial Nerves     CN III, IV, VI   Pupils are equal, round, and reactive to light.  Extraocular motions are normal.     Motor Exam     Strength   Strength 5/5 throughout.       Assessment/Plan   Independent Review of Radiographic Studies:    Postop lumbar x-rays dated June 11, 2019 reveal stable findings with intact surgical hardware.  No abnormal motion or instability seen with flexion or extension.      Medical Decision Making:    He returns the office today for ongoing follow-up status post 2 level lumbar decompression and fusion.  Exam as noted above, no red flags.  He is doing and feeling very well and preoperative right leg pain has completely resolved.  Strength is almost normal.  He is doing cardiac rehab secondary to the heart attack that he suffered in April.  Postoperative x-ray shows stable findings with no new abnormalities.  From our standpoint we will see him back on an as-needed basis.  He has no restrictions going forward from our  standpoint.    Plan: Return office as needed    Claudio was seen today for back pain.    Diagnoses and all orders for this visit:    Post-operative state    S/P lumbar fusion      No Follow-up on file.

## (undated) DEVICE — COL BONE ANSPACH

## (undated) DEVICE — Device

## (undated) DEVICE — PIN, 9733235, 100MM, STERILE, PERC REF

## (undated) DEVICE — DRP SLUSH WARMR MACH 52X66IN OM-ORS-301

## (undated) DEVICE — 3M™ STERI-DRAPE™ INSTRUMENT POUCH 1018: Brand: STERI-DRAPE™

## (undated) DEVICE — NDL HYPO PRECISIONGLIDE REG 25G 1 1/2

## (undated) DEVICE — GUIDEWIRE 2345050 BLUNT THREADED 24IN

## (undated) DEVICE — ADHS SKIN DERMABOND TOP ADVANCED

## (undated) DEVICE — DRP C/ARM 41X74IN

## (undated) DEVICE — ENCORE® LATEX ORTHO SIZE 8.5, STERILE LATEX POWDER-FREE SURGICAL GLOVE: Brand: ENCORE

## (undated) DEVICE — TOTAL TRAY, 16FR 10ML SIL FOLEY, URN: Brand: MEDLINE

## (undated) DEVICE — PK NEURO SPINE 40

## (undated) DEVICE — THE FLOSEAL MALLEABLE TIP AND TRIMMABLE TIP ARE INTENDED FOR DELIVERY OF FLOSEAL HEMOSTATIC MATRIX.: Brand: FLOSEAL SPECIAL APPLICATOR TIPS

## (undated) DEVICE — APPL CHLORAPREP W/TINT 26ML ORNG

## (undated) DEVICE — 3.0MM PRECISION NEURO (MATCH HEAD)

## (undated) DEVICE — CODMAN® SURGICAL PATTIES 3/4" X 3/4" (1.91CM X 1.91CM): Brand: CODMAN®

## (undated) DEVICE — SMOKE EVACUATION TUBING WITH 7/8 IN TO 1/4 IN REDUCER: Brand: BUFFALO FILTER

## (undated) DEVICE — SPHR MARKR STEALTH STATION

## (undated) DEVICE — GOWN,PREVENTION PLUS,XXLARGE,STERILE: Brand: MEDLINE

## (undated) DEVICE — ANTIBACTERIAL UNDYED BRAIDED (POLYGLACTIN 910), SYNTHETIC ABSORBABLE SUTURE: Brand: COATED VICRYL

## (undated) DEVICE — SHLD ANGIO 2LAYR CIR FEN

## (undated) DEVICE — CABLE 2344000 POWEREASE NIM: Brand: POWEREASE™ INSTRUMENTS

## (undated) DEVICE — 3M™ IOBAN™ 2 ANTIMICROBIAL INCISE DRAPE 6650EZ: Brand: IOBAN™ 2

## (undated) DEVICE — NDL SPINE 20G 3 1/2 YEL STRL 1P/U

## (undated) DEVICE — SUT MNCRYL PLS ANTIB UD 4/0 PS2 18IN

## (undated) DEVICE — DRP STRL STERILEZ ZFLD

## (undated) DEVICE — SUT PROLN 8/0 BV130/5 D/A 24IN 8732H

## (undated) DEVICE — DRP MICROSCOPE 4 BINOCULAR CV 54X150IN

## (undated) DEVICE — DISPOSABLE 9450003 ELECTRO TWST PAIR 8CH

## (undated) DEVICE — GLV SURG SENSICARE MICRO PF LF 8 STRL

## (undated) DEVICE — 1010 S-DRAPE TOWEL DRAPE 10/BX: Brand: STERI-DRAPE™

## (undated) DEVICE — GLV SURG BIOGEL LTX PF 7 1/2

## (undated) DEVICE — CONN TBG Y 5 IN 1 LF STRL